# Patient Record
Sex: MALE | Race: WHITE | HISPANIC OR LATINO | Employment: FULL TIME | URBAN - METROPOLITAN AREA
[De-identification: names, ages, dates, MRNs, and addresses within clinical notes are randomized per-mention and may not be internally consistent; named-entity substitution may affect disease eponyms.]

---

## 2018-07-18 PROBLEM — E78.5 HYPERLIPIDEMIA: Status: ACTIVE | Noted: 2017-03-28

## 2018-07-18 RX ORDER — ASPIRIN 81 MG/1
TABLET ORAL EVERY 24 HOURS
COMMUNITY
Start: 2018-01-11

## 2018-07-18 RX ORDER — ATORVASTATIN CALCIUM 20 MG/1
TABLET, FILM COATED ORAL EVERY 24 HOURS
COMMUNITY
Start: 2018-01-11 | End: 2018-10-15 | Stop reason: SDUPTHER

## 2018-07-18 RX ORDER — AMLODIPINE BESYLATE 10 MG/1
TABLET ORAL EVERY 24 HOURS
COMMUNITY
Start: 2018-04-11 | End: 2018-07-19 | Stop reason: SDUPTHER

## 2018-07-19 ENCOUNTER — OFFICE VISIT (OUTPATIENT)
Dept: FAMILY MEDICINE CLINIC | Facility: CLINIC | Age: 53
End: 2018-07-19
Payer: COMMERCIAL

## 2018-07-19 VITALS
BODY MASS INDEX: 29.18 KG/M2 | RESPIRATION RATE: 16 BRPM | OXYGEN SATURATION: 99 % | DIASTOLIC BLOOD PRESSURE: 106 MMHG | WEIGHT: 197 LBS | SYSTOLIC BLOOD PRESSURE: 154 MMHG | HEART RATE: 59 BPM | HEIGHT: 69 IN | TEMPERATURE: 97.2 F

## 2018-07-19 DIAGNOSIS — I10 BENIGN ESSENTIAL HYPERTENSION: ICD-10-CM

## 2018-07-19 DIAGNOSIS — E78.2 MIXED HYPERLIPIDEMIA: ICD-10-CM

## 2018-07-19 DIAGNOSIS — Z11.4 ENCOUNTER FOR SCREENING FOR HIV: ICD-10-CM

## 2018-07-19 DIAGNOSIS — Z13.1 DIABETES MELLITUS SCREENING: ICD-10-CM

## 2018-07-19 DIAGNOSIS — I10 BENIGN ESSENTIAL HYPERTENSION: Primary | ICD-10-CM

## 2018-07-19 PROCEDURE — 99213 OFFICE O/P EST LOW 20 MIN: CPT | Performed by: FAMILY MEDICINE

## 2018-07-19 PROCEDURE — 1036F TOBACCO NON-USER: CPT | Performed by: FAMILY MEDICINE

## 2018-07-19 PROCEDURE — 3008F BODY MASS INDEX DOCD: CPT | Performed by: FAMILY MEDICINE

## 2018-07-19 RX ORDER — AMLODIPINE BESYLATE 10 MG/1
10 TABLET ORAL EVERY 24 HOURS
Qty: 30 TABLET | Refills: 3 | Status: SHIPPED | OUTPATIENT
Start: 2018-07-19 | End: 2018-07-19 | Stop reason: SDUPTHER

## 2018-07-19 RX ORDER — AMLODIPINE BESYLATE 10 MG/1
TABLET ORAL
Qty: 30 TABLET | Refills: 0 | Status: SHIPPED | OUTPATIENT
Start: 2018-07-19 | End: 2018-08-16 | Stop reason: SDUPTHER

## 2018-07-19 NOTE — ASSESSMENT & PLAN NOTE
Patient reports there has not been taking his blood pressure medication in the last 2 months as some refills have not been sent  Will send a refill as per patient advised to take medication tonight     Return to clinic in 1 week for blood pressure check

## 2018-07-26 ENCOUNTER — CLINICAL SUPPORT (OUTPATIENT)
Dept: FAMILY MEDICINE CLINIC | Facility: CLINIC | Age: 53
End: 2018-07-26

## 2018-07-26 VITALS — SYSTOLIC BLOOD PRESSURE: 146 MMHG | DIASTOLIC BLOOD PRESSURE: 90 MMHG

## 2018-07-26 DIAGNOSIS — I10 HYPERTENSION, UNSPECIFIED TYPE: Primary | ICD-10-CM

## 2018-07-26 PROCEDURE — RECHECK

## 2018-07-26 RX ORDER — ATORVASTATIN CALCIUM 20 MG/1
TABLET, FILM COATED ORAL EVERY 24 HOURS
COMMUNITY
Start: 2018-01-11 | End: 2018-10-15

## 2018-07-26 RX ORDER — HYDROCHLOROTHIAZIDE 12.5 MG/1
12.5 TABLET ORAL DAILY
Qty: 30 TABLET | Refills: 3 | Status: SHIPPED | OUTPATIENT
Start: 2018-07-26 | End: 2018-10-15

## 2018-07-26 NOTE — PROGRESS NOTES
Spoke with patient about blood pressure will add hydrochlorothiazide 12 5 to be taken in the morning  Advised patient to call the office if he notices any side effects  Patient to return to clinic in 1 week for blood pressure check

## 2018-07-26 NOTE — Clinical Note
Pt here for BP check; slight improvement from last reading but still elevated  Please contact pt with further instructions    Thanks Isabella Ashford

## 2018-08-16 DIAGNOSIS — I10 BENIGN ESSENTIAL HYPERTENSION: ICD-10-CM

## 2018-08-16 RX ORDER — AMLODIPINE BESYLATE 10 MG/1
TABLET ORAL
Qty: 30 TABLET | Refills: 0 | Status: SHIPPED | OUTPATIENT
Start: 2018-08-16 | End: 2018-09-15 | Stop reason: SDUPTHER

## 2018-09-15 DIAGNOSIS — I10 BENIGN ESSENTIAL HYPERTENSION: ICD-10-CM

## 2018-09-17 RX ORDER — AMLODIPINE BESYLATE 10 MG/1
TABLET ORAL
Qty: 30 TABLET | Refills: 0 | Status: SHIPPED | OUTPATIENT
Start: 2018-09-17 | End: 2018-10-15 | Stop reason: SDUPTHER

## 2018-10-06 ENCOUNTER — APPOINTMENT (OUTPATIENT)
Dept: LAB | Facility: HOSPITAL | Age: 53
End: 2018-10-06
Payer: COMMERCIAL

## 2018-10-06 DIAGNOSIS — I10 BENIGN ESSENTIAL HYPERTENSION: ICD-10-CM

## 2018-10-06 DIAGNOSIS — Z13.1 DIABETES MELLITUS SCREENING: ICD-10-CM

## 2018-10-06 DIAGNOSIS — E78.2 MIXED HYPERLIPIDEMIA: ICD-10-CM

## 2018-10-06 DIAGNOSIS — Z11.4 ENCOUNTER FOR SCREENING FOR HIV: ICD-10-CM

## 2018-10-06 LAB
ANION GAP SERPL CALCULATED.3IONS-SCNC: 10 MMOL/L (ref 5–14)
BUN SERPL-MCNC: 17 MG/DL (ref 5–25)
CALCIUM SERPL-MCNC: 10 MG/DL (ref 8.4–10.2)
CHLORIDE SERPL-SCNC: 100 MMOL/L (ref 97–108)
CHOLEST SERPL-MCNC: 242 MG/DL
CO2 SERPL-SCNC: 29 MMOL/L (ref 22–30)
CREAT SERPL-MCNC: 1.13 MG/DL (ref 0.7–1.5)
EST. AVERAGE GLUCOSE BLD GHB EST-MCNC: 97 MG/DL
GFR SERPL CREATININE-BSD FRML MDRD: 73 ML/MIN/1.73SQ M
GLUCOSE P FAST SERPL-MCNC: 102 MG/DL (ref 70–99)
HBA1C MFR BLD: 5 % (ref 4.2–6.3)
HDLC SERPL-MCNC: 42 MG/DL (ref 40–59)
LDLC SERPL CALC-MCNC: 183 MG/DL
NONHDLC SERPL-MCNC: 200 MG/DL
POTASSIUM SERPL-SCNC: 4.9 MMOL/L (ref 3.6–5)
SODIUM SERPL-SCNC: 139 MMOL/L (ref 137–147)
TRIGL SERPL-MCNC: 85 MG/DL

## 2018-10-06 PROCEDURE — 36415 COLL VENOUS BLD VENIPUNCTURE: CPT

## 2018-10-06 PROCEDURE — 83036 HEMOGLOBIN GLYCOSYLATED A1C: CPT

## 2018-10-06 PROCEDURE — 80048 BASIC METABOLIC PNL TOTAL CA: CPT

## 2018-10-06 PROCEDURE — 87389 HIV-1 AG W/HIV-1&-2 AB AG IA: CPT

## 2018-10-06 PROCEDURE — 80061 LIPID PANEL: CPT

## 2018-10-08 LAB — HIV 1+2 AB+HIV1 P24 AG SERPL QL IA: NORMAL

## 2018-10-14 PROBLEM — Z11.4 ENCOUNTER FOR SCREENING FOR HIV: Status: RESOLVED | Noted: 2018-07-19 | Resolved: 2018-10-14

## 2018-10-14 PROBLEM — Z13.1 DIABETES MELLITUS SCREENING: Status: RESOLVED | Noted: 2018-07-19 | Resolved: 2018-10-14

## 2018-10-15 ENCOUNTER — OFFICE VISIT (OUTPATIENT)
Dept: FAMILY MEDICINE CLINIC | Facility: CLINIC | Age: 53
End: 2018-10-15
Payer: COMMERCIAL

## 2018-10-15 VITALS
BODY MASS INDEX: 30.21 KG/M2 | OXYGEN SATURATION: 98 % | HEIGHT: 69 IN | SYSTOLIC BLOOD PRESSURE: 150 MMHG | WEIGHT: 204 LBS | RESPIRATION RATE: 18 BRPM | DIASTOLIC BLOOD PRESSURE: 84 MMHG | HEART RATE: 70 BPM | TEMPERATURE: 97.4 F

## 2018-10-15 DIAGNOSIS — I10 HYPERTENSION, UNSPECIFIED TYPE: ICD-10-CM

## 2018-10-15 DIAGNOSIS — I10 BENIGN ESSENTIAL HYPERTENSION: Primary | ICD-10-CM

## 2018-10-15 DIAGNOSIS — E78.2 MIXED HYPERLIPIDEMIA: ICD-10-CM

## 2018-10-15 DIAGNOSIS — Z28.21 VACCINATION NOT CARRIED OUT BECAUSE OF PATIENT REFUSAL: ICD-10-CM

## 2018-10-15 PROBLEM — Z23 ENCOUNTER FOR IMMUNIZATION: Status: ACTIVE | Noted: 2018-10-15

## 2018-10-15 PROCEDURE — 99213 OFFICE O/P EST LOW 20 MIN: CPT | Performed by: FAMILY MEDICINE

## 2018-10-15 PROCEDURE — 3008F BODY MASS INDEX DOCD: CPT | Performed by: FAMILY MEDICINE

## 2018-10-15 RX ORDER — ATORVASTATIN CALCIUM 20 MG/1
20 TABLET, FILM COATED ORAL DAILY
Qty: 30 TABLET | Refills: 3 | Status: SHIPPED | OUTPATIENT
Start: 2018-10-15 | End: 2021-11-03 | Stop reason: SDUPTHER

## 2018-10-15 RX ORDER — AMLODIPINE BESYLATE 10 MG/1
10 TABLET ORAL DAILY
Qty: 90 TABLET | Refills: 1 | Status: SHIPPED | OUTPATIENT
Start: 2018-10-15 | End: 2019-07-28 | Stop reason: SDDI

## 2018-10-15 NOTE — PROGRESS NOTES
Assessment/Plan:    Hyperlipidemia  Will repeat lipid profile if applicable  Patient is on appropriate anti lipid medication  Discussed and emphasized the importance of diet and exercise  Patient acknowledges that they understood what was discussed  Benign essential hypertension  Patient's blood pressure is currently not controlled  However, patient reports that he takes his blood pressure at home every day and his blood pressures under 120/80 every day at home  Therefore, will not make any changes to medication at the moment  Advised patient to bring in his blood pressure monitor on the next visit to go over his technique  Patient was given a sheet to log his blood pressures and the sheet on education of of things to do to improve his blood pressure  Patient denies any side effects with medications  Patient educated on the importance of weight loss, and appropriate dieting  Patient admits to be compliant with medications  Vaccination not carried out because of patient refusal  Patient denying flu vaccine  Diagnoses and all orders for this visit:    Benign essential hypertension  -     amLODIPine (NORVASC) 10 mg tablet; Take 1 tablet (10 mg total) by mouth daily    Mixed hyperlipidemia  -     atorvastatin (LIPITOR) 20 mg tablet; Take 1 tablet (20 mg total) by mouth daily    Hypertension, unspecified type    Vaccination not carried out because of patient refusal  -     influenza vaccine, [id number], quadrivalent, recombinant, PF, 0 5 mL, for patients 18 yr+ (FLUBLOK)          Subjective:      Patient ID: Sukumar Durbin is a 46 y o  male  This is a pleasant 26-year-old male with known past medical history of hyperlipidemia and hypertension who presents to the clinic for follow-up visit  Patient reports that his blood pressures have been well controlled at home with blood pressures under 120/80 and he is compliant with his medication    Patient states that he never received the hydrochlorothiazide but has been monitoring his blood pressures and is not concerned at the moment  Patient denies any chest pain or blood in the stool  The following portions of the patient's history were reviewed and updated as appropriate:   He  has a past medical history of HLD (hyperlipidemia) and Hypertension  He   Patient Active Problem List    Diagnosis Date Noted    Vaccination not carried out because of patient refusal 10/15/2018    Hyperlipidemia 03/28/2017    Benign essential hypertension 10/12/2012     He  has a past surgical history that includes No past surgeries  His family history includes Benign prostatic hyperplasia in his father; Diabetes in his father; No Known Problems in his mother; Prostate cancer in his father  He  reports that he has never smoked  He has never used smokeless tobacco  He reports that he drinks alcohol  He reports that he does not use drugs  Current Outpatient Prescriptions   Medication Sig Dispense Refill    amLODIPine (NORVASC) 10 mg tablet Take 1 tablet (10 mg total) by mouth daily 90 tablet 1    aspirin (ASPIR-81) 81 mg EC tablet every 24 hours      atorvastatin (LIPITOR) 20 mg tablet Take 1 tablet (20 mg total) by mouth daily 30 tablet 3    Calcium Carbonate-Vitamin D (CALCIUM-D) 600-400 MG-UNIT TABS Take 1 tablet by mouth 2 (two) times a day       No current facility-administered medications for this visit       Review of Systems   Constitutional: Negative for fatigue and fever  HENT: Negative for congestion and sore throat  Eyes: Negative for visual disturbance  Respiratory: Negative for cough, shortness of breath and wheezing  Cardiovascular: Negative for chest pain, palpitations and leg swelling  Gastrointestinal: Negative for abdominal pain, anal bleeding, constipation, diarrhea, nausea and vomiting  Endocrine: Negative for cold intolerance and heat intolerance  Musculoskeletal: Negative for arthralgias and joint swelling  Skin: Negative for color change  Neurological: Negative for dizziness, seizures, syncope, weakness and light-headedness  Psychiatric/Behavioral: Negative for agitation, confusion, sleep disturbance and suicidal ideas  Objective:      /84 (BP Location: Right arm, Patient Position: Sitting, Cuff Size: Standard)   Pulse 70   Temp (!) 97 4 °F (36 3 °C) (Temporal)   Resp 18   Ht 5' 8 5" (1 74 m)   Wt 92 5 kg (204 lb)   SpO2 98%   BMI 30 57 kg/m²          Physical Exam   Constitutional: He is oriented to person, place, and time  He appears well-developed and well-nourished  HENT:   Head: Normocephalic and atraumatic  Eyes: Pupils are equal, round, and reactive to light  Conjunctivae and EOM are normal    Neck: Normal range of motion  Neck supple  No JVD present  Cardiovascular: Normal rate, regular rhythm, normal heart sounds and intact distal pulses  Exam reveals no gallop and no friction rub  No murmur heard  Pulmonary/Chest: Effort normal and breath sounds normal  No respiratory distress  He has no wheezes  Abdominal: Soft  Bowel sounds are normal  He exhibits no distension  There is no tenderness  Musculoskeletal: Normal range of motion  Neurological: He is alert and oriented to person, place, and time  He has normal reflexes  Skin: Skin is warm and dry     Psychiatric: His behavior is normal  Judgment and thought content normal

## 2018-10-15 NOTE — ASSESSMENT & PLAN NOTE
Patient's blood pressure is currently not controlled  However, patient reports that he takes his blood pressure at home every day and his blood pressures under 120/80 every day at home  Therefore, will not make any changes to medication at the moment  Advised patient to bring in his blood pressure monitor on the next visit to go over his technique  Patient was given a sheet to log his blood pressures and the sheet on education of of things to do to improve his blood pressure  Patient denies any side effects with medications  Patient educated on the importance of weight loss, and appropriate dieting  Patient admits to be compliant with medications

## 2019-01-21 PROBLEM — Z11.59 SCREENING FOR VIRAL DISEASE: Status: ACTIVE | Noted: 2019-01-21

## 2019-07-26 DIAGNOSIS — I10 BENIGN ESSENTIAL HYPERTENSION: ICD-10-CM

## 2019-07-28 RX ORDER — AMLODIPINE BESYLATE 10 MG/1
10 TABLET ORAL EVERY 24 HOURS
Qty: 30 TABLET | Refills: 0 | Status: SHIPPED | OUTPATIENT
Start: 2019-07-28 | End: 2020-07-20

## 2019-07-29 NOTE — TELEPHONE ENCOUNTER
This patient has not been seen in 9 months in this office  Prescribed 30 days worth of medication  Will get no further medications until seen  Will contact clerical staff to schedule appointment

## 2020-07-20 DIAGNOSIS — I10 BENIGN ESSENTIAL HYPERTENSION: ICD-10-CM

## 2020-07-20 RX ORDER — AMLODIPINE BESYLATE 10 MG/1
TABLET ORAL
Qty: 90 TABLET | Refills: 1 | Status: SHIPPED | OUTPATIENT
Start: 2020-07-20 | End: 2021-01-14

## 2021-01-14 DIAGNOSIS — I10 BENIGN ESSENTIAL HYPERTENSION: ICD-10-CM

## 2021-01-14 RX ORDER — AMLODIPINE BESYLATE 10 MG/1
TABLET ORAL
Qty: 90 TABLET | Refills: 1 | Status: SHIPPED | OUTPATIENT
Start: 2021-01-14 | End: 2021-06-14

## 2021-06-12 DIAGNOSIS — I10 BENIGN ESSENTIAL HYPERTENSION: ICD-10-CM

## 2021-06-14 RX ORDER — AMLODIPINE BESYLATE 10 MG/1
TABLET ORAL
Qty: 90 TABLET | Refills: 1 | Status: SHIPPED | OUTPATIENT
Start: 2021-06-14 | End: 2021-11-03 | Stop reason: SDUPTHER

## 2021-11-02 PROBLEM — I25.10 CORONARY ARTERY DISEASE INVOLVING NATIVE CORONARY ARTERY OF NATIVE HEART: Status: ACTIVE | Noted: 2021-11-02

## 2021-11-02 PROBLEM — I21.4 NSTEMI (NON-ST ELEVATED MYOCARDIAL INFARCTION) (HCC): Status: ACTIVE | Noted: 2020-06-04

## 2021-11-02 PROBLEM — I21.4 NSTEMI (NON-ST ELEVATED MYOCARDIAL INFARCTION) (HCC): Status: RESOLVED | Noted: 2020-06-04 | Resolved: 2021-11-02

## 2021-11-02 RX ORDER — CARVEDILOL 3.12 MG/1
3.12 TABLET ORAL
COMMUNITY
Start: 2021-06-21 | End: 2021-11-03

## 2021-11-03 ENCOUNTER — OFFICE VISIT (OUTPATIENT)
Dept: FAMILY MEDICINE CLINIC | Facility: CLINIC | Age: 56
End: 2021-11-03

## 2021-11-03 ENCOUNTER — APPOINTMENT (OUTPATIENT)
Dept: LAB | Facility: CLINIC | Age: 56
End: 2021-11-03
Payer: COMMERCIAL

## 2021-11-03 VITALS
HEART RATE: 52 BPM | BODY MASS INDEX: 29.92 KG/M2 | SYSTOLIC BLOOD PRESSURE: 130 MMHG | WEIGHT: 202 LBS | TEMPERATURE: 96 F | DIASTOLIC BLOOD PRESSURE: 70 MMHG | RESPIRATION RATE: 16 BRPM | HEIGHT: 69 IN | OXYGEN SATURATION: 99 %

## 2021-11-03 DIAGNOSIS — R21 RASH AND NONSPECIFIC SKIN ERUPTION: ICD-10-CM

## 2021-11-03 DIAGNOSIS — I25.10 CORONARY ARTERY DISEASE INVOLVING NATIVE CORONARY ARTERY OF NATIVE HEART WITHOUT ANGINA PECTORIS: ICD-10-CM

## 2021-11-03 DIAGNOSIS — Z11.59 NEED FOR HEPATITIS C SCREENING TEST: ICD-10-CM

## 2021-11-03 DIAGNOSIS — I10 HTN, GOAL BELOW 130/80: ICD-10-CM

## 2021-11-03 DIAGNOSIS — E78.2 MIXED HYPERLIPIDEMIA: ICD-10-CM

## 2021-11-03 DIAGNOSIS — I10 HTN, GOAL BELOW 130/80: Primary | ICD-10-CM

## 2021-11-03 DIAGNOSIS — I10 BENIGN ESSENTIAL HYPERTENSION: ICD-10-CM

## 2021-11-03 LAB
ANION GAP SERPL CALCULATED.3IONS-SCNC: 4 MMOL/L (ref 4–13)
BUN SERPL-MCNC: 17 MG/DL (ref 5–25)
CALCIUM SERPL-MCNC: 9.4 MG/DL (ref 8.3–10.1)
CHLORIDE SERPL-SCNC: 101 MMOL/L (ref 100–108)
CHOLEST SERPL-MCNC: 227 MG/DL (ref 50–200)
CO2 SERPL-SCNC: 30 MMOL/L (ref 21–32)
CREAT SERPL-MCNC: 1.15 MG/DL (ref 0.6–1.3)
GFR SERPL CREATININE-BSD FRML MDRD: 70 ML/MIN/1.73SQ M
GLUCOSE P FAST SERPL-MCNC: 103 MG/DL (ref 65–99)
HCV AB SER QL: NORMAL
HDLC SERPL-MCNC: 51 MG/DL
LDLC SERPL CALC-MCNC: 162 MG/DL (ref 0–100)
NONHDLC SERPL-MCNC: 176 MG/DL
POTASSIUM SERPL-SCNC: 4.5 MMOL/L (ref 3.5–5.3)
SODIUM SERPL-SCNC: 135 MMOL/L (ref 136–145)
TRIGL SERPL-MCNC: 70 MG/DL

## 2021-11-03 PROCEDURE — 86803 HEPATITIS C AB TEST: CPT

## 2021-11-03 PROCEDURE — 1036F TOBACCO NON-USER: CPT | Performed by: FAMILY MEDICINE

## 2021-11-03 PROCEDURE — 80061 LIPID PANEL: CPT

## 2021-11-03 PROCEDURE — 36415 COLL VENOUS BLD VENIPUNCTURE: CPT

## 2021-11-03 PROCEDURE — 80048 BASIC METABOLIC PNL TOTAL CA: CPT

## 2021-11-03 PROCEDURE — 3008F BODY MASS INDEX DOCD: CPT | Performed by: FAMILY MEDICINE

## 2021-11-03 PROCEDURE — 99214 OFFICE O/P EST MOD 30 MIN: CPT | Performed by: FAMILY MEDICINE

## 2021-11-03 PROCEDURE — 3725F SCREEN DEPRESSION PERFORMED: CPT | Performed by: FAMILY MEDICINE

## 2021-11-03 RX ORDER — ATORVASTATIN CALCIUM 40 MG/1
40 TABLET, FILM COATED ORAL DAILY
Qty: 90 TABLET | Refills: 2 | Status: SHIPPED | OUTPATIENT
Start: 2021-11-03

## 2021-11-03 RX ORDER — AMLODIPINE BESYLATE 5 MG/1
10 TABLET ORAL DAILY
Qty: 90 TABLET | Refills: 2 | Status: SHIPPED | OUTPATIENT
Start: 2021-11-03 | End: 2021-11-03 | Stop reason: SDUPTHER

## 2021-11-03 RX ORDER — AMLODIPINE BESYLATE 5 MG/1
5 TABLET ORAL DAILY
Qty: 90 TABLET | Refills: 2 | Status: SHIPPED | OUTPATIENT
Start: 2021-11-03

## 2021-11-03 RX ORDER — FEXOFENADINE HCL 180 MG/1
180 TABLET ORAL DAILY
Qty: 30 TABLET | Refills: 2 | Status: SHIPPED | OUTPATIENT
Start: 2021-11-03

## 2021-11-04 ENCOUNTER — TELEPHONE (OUTPATIENT)
Dept: FAMILY MEDICINE CLINIC | Facility: CLINIC | Age: 56
End: 2021-11-04

## 2021-11-04 DIAGNOSIS — Z12.5 PROSTATE CANCER SCREENING: Primary | ICD-10-CM

## 2021-11-05 ENCOUNTER — APPOINTMENT (OUTPATIENT)
Dept: LAB | Facility: CLINIC | Age: 56
End: 2021-11-05
Payer: COMMERCIAL

## 2021-11-05 DIAGNOSIS — Z12.5 PROSTATE CANCER SCREENING: ICD-10-CM

## 2021-11-05 LAB — PSA SERPL-MCNC: 0.3 NG/ML (ref 0–4)

## 2021-11-05 PROCEDURE — G0103 PSA SCREENING: HCPCS

## 2021-11-05 PROCEDURE — 36415 COLL VENOUS BLD VENIPUNCTURE: CPT

## 2022-10-11 ENCOUNTER — TELEPHONE (OUTPATIENT)
Dept: FAMILY MEDICINE CLINIC | Facility: CLINIC | Age: 57
End: 2022-10-11

## 2022-10-11 DIAGNOSIS — I10 BENIGN ESSENTIAL HYPERTENSION: ICD-10-CM

## 2022-10-11 NOTE — TELEPHONE ENCOUNTER
Pt came into office requesting medication refill for     amLODIPine (NORVASC) 5 mg tablet     Please send to pharmacy on file

## 2022-10-11 NOTE — TELEPHONE ENCOUNTER
Pt came into office requesting to change PCP and upcoming appt due to needing afternoon appointments  I have rescheduled his upcoming appt with Dr Vahid Heard for 11/9/22

## 2022-10-15 RX ORDER — AMLODIPINE BESYLATE 5 MG/1
5 TABLET ORAL DAILY
Qty: 90 TABLET | Refills: 2 | Status: SHIPPED | OUTPATIENT
Start: 2022-10-15

## 2022-11-09 ENCOUNTER — OFFICE VISIT (OUTPATIENT)
Dept: FAMILY MEDICINE CLINIC | Facility: CLINIC | Age: 57
End: 2022-11-09

## 2022-11-09 VITALS
WEIGHT: 213 LBS | DIASTOLIC BLOOD PRESSURE: 82 MMHG | HEIGHT: 69 IN | TEMPERATURE: 97.6 F | SYSTOLIC BLOOD PRESSURE: 132 MMHG | OXYGEN SATURATION: 99 % | RESPIRATION RATE: 16 BRPM | BODY MASS INDEX: 31.55 KG/M2 | HEART RATE: 86 BPM

## 2022-11-09 DIAGNOSIS — Z12.11 ENCOUNTER FOR SCREENING COLONOSCOPY: ICD-10-CM

## 2022-11-09 DIAGNOSIS — M72.2 PLANTAR FASCIITIS: ICD-10-CM

## 2022-11-09 DIAGNOSIS — Z00.00 ROUTINE CHECK-UP: Primary | ICD-10-CM

## 2022-11-09 RX ORDER — IBUPROFEN 600 MG/1
600 TABLET ORAL EVERY 6 HOURS PRN
Qty: 30 TABLET | Refills: 0 | Status: SHIPPED | OUTPATIENT
Start: 2022-11-09 | End: 2022-11-23

## 2022-11-09 NOTE — PATIENT INSTRUCTIONS
Medición del antígeno prostático específico   INFORMACIÓN GENERAL:  ¿Qué es apolinar examen? Apolinar examen mide la cantidad de antígeno prostático específico (PSA) en la kavon  Apolinar examen puede ser usado cuando hiperplasia prostática benigna es sospechada  [place] también puede ser usado para analizar por cáncer de próstata, para ayudar diagnosticar cáncer de próstata cuando es sospechado y para supervisar cáncer de próstata después de tratamiento  ¿Cuáles son otros nombres para apolinar examen? Prostate specific antigen measurement  PSA measurement  PSA - Prostate-specific antigen level  PSA - Serum prostate specific antigen level  tPSA measurement - Total prostate specific antigen measurement  ¿Cuáles son otros exámenes similares? Biopsia transrectal de la próstata  Examen rectal  ¿Por qué puedo necesitar apolinar examen? Los exámenes de laboratorio pueden realizarse por muchas razones  Los exámenes se realizan [address] [facility] rutinaria de giacomo o sospecha de enfermedad o de toxicidad  También pueden ser [employer] para determinar si [place]  Los exámenes también podrían usarse para medir la efectividad o fracaso de un medicamento o plan de tratamiento  Pueden ser solicitados por razones médicas o legales  Usted podría necesitar apolinar examen si tiene:   Cáncer de próstata  Hipertrofia prostática benigna (HPB)  ¿Cuándo y con qué frecuencia puedo tener apolinar examen? Existen varios factores para determinar cuándo y con qué frecuencia se pueden realizar los exámenes  Collins duración puede depender de [place] o terminación de otros exámenes, procedimientos o tratamientos  Los exámenes pueden ser realizados inmediatamente en gloria emergencia o pueden ser demorados conforme gloria condición es tratada o monitoreada  Se puede sugerir un examen o llegar a ser necesario cuando aparecen ciertos signos o síntomas    Debido a cambios de las funciones naturales del organismo tessa [address], [place] exámenes pueden ser realizados en gloria determinada hora  Si usted se ha preparado para apolinar examen con cambios en la ingesta de comida o líquidos, los exámenes pueden ser realizados de acuerdo con [employer]  Los intervalos para la realización de los exámenes pueden basarse en el aumento o disminución de los niveles de [place], drogas u otras sustancias en el organismo  La edad o el género de las personas puede influir en la fecha y la frecuencia con que se requiere un examen  Las condiciones crónicas o progresivas pueden necesitar un monitoreo continuo mediante exámenes  Ciertos exámenes pueden ser repetidos para obtener gloria serie de [place] o para confirmar o [employer]  Las veces que deban [employer] exámenes y robertson frecuencia varían dependiendo si se llevan a cabo por razones profesionales o legales  ¿Cómo marisa prepararme para el examen? Antes de que le tomen la [facility], informe al técnico laboratorista si es usted alérgico al látex y si está ingiriendo medicamentos que puedan causar sangrado excesivo  También informe si en alguna ocasión presentó náuseas, ligero dolor de [place] o debilidad cuando le [facility] [place]  ¿Cómo se hace el examen? Cuando se necesita obtener gloria muestra de [place], generalmente se selecciona gloria vena del brazo  Se puede poner un torniquete (canas larga de caucho) para katy la vena  Se limpia la piel sobre la vena y se introduce la aguja  Le pedirán que permanezca muy quieto mientras dominik [facility]  La kavon se puede recolectar en néstor o más tubos y el torniquete puede ser retirado  Cuando se ha obtenido suficiente kavon para la [place] técnico laboratorista saca la aguja  ¿Qué me sentiré tessa el examen? El [place] que usted puede sentir dependerá de muchos factores, incluyendo robertson sensibilidad para el dolor  Comuníquele a la persona que realiza la prueba lo que esté sintiendo   Informe a dicha persona si usted no puede continuar con el procedimiento  Taiwo la felipe de la [place] puede sentir gloria ligera molestia en el sitio donde ésta se obtiene  ¿Qué marisa hacer después del examen? Después de obtener la [place] sanguínea de gloria vena, se coloca un algodón, ray adhesiva o gasa en el área donde fue insertada la aguja  Le pueden solicitar que aplique presión en el área  Evite practicar ejercicio intenso inmediatamente después de la felipe sanguínea  Avise al técnico laboratorista si usted presenta dolor, enrojecimiento, hinchazón o descarga en el sitio de la punción  ¿Cuáles son los riesgos? Kavon: En el sitio donde se felipe la [place] puede ocurrir la extravasación de Vishnu, un hematoma (kavon extravasada debajo de la piel), así alf gloria infección  Las personas que realizan estos exámenes pueden necesitar hacerlos más de gloria vez  Consulte con robertson médico en bria de mattie acerca de los riesgos de [facility]  ¿Cuáles son los resultados normales para éste examen? Los [employer] de las pruebas de laboratorio pueden variar dependiendo de la edad, género, historia clínica, el método usado para esta prueba y Jb Diadema 1904  Si monserrat resultados son diferentes de los [employer] sugeridos a continuación, esto no significa que usted tenga gloria enfermedad  Contacte a robertson médico para cualquier pregunta que tenga  Los C H  [employer] son considerados normales para estas pruebas:  Varones, ?40 años de edad: 0-2 ng/mL (0-2 mcg/L) :  Varones, >40 años de edad: 0-4 ng/mL (0-4 mcg/L)  Hembras: <0 5 ng/mL (<0 5 mcg/L) :  ¿Qué puede afectar los resultados de mi examen? Finasteride  [place] [address] Capromab Pendetide  La eyaculación y un examen rectal digital pueden causar un subida insignificante en los niveles de PSA, mientras la biopsia de próstata y citoscopia pueden causar aumentos sustanciales; los examenes de PSA deberían ser retrasados hasta 3 a 4 semanas después de estos procedimientos   El ejercicio, infección y algunos medicamentos puede también causar gloria subida en los niveles de PSA  Finasteride bajará PSA por el aproximadamente 50%  ¿Qué son los próximos pasos después del examen? Pide a tu médico que te informe [employer] de tus exámenes  Puede ser que te pidan que llames para pedir [place], que hagas gloria silas para discutirlos con tu médico, o puede ser que [employer] por correo  El seguimiento depende de muchos factores relacionados con los resultados de [place]  Incluso, puede ser que no tengas que hacer ningún seguimiento después de [facility]  [employer]  En algunos casos, pueden sugerirte o ser [employer]  Algunos ejemplos de seguimiento pueden ser cambios en tus medicamentos, o planes de tratamiento, referirte a un especialista, gloria vigilancia con mayor o narendra frecuencia, o pruebas o procedimientos adicionales  Platica con tu médico sobre cualquier cosa que te preocupe o cualquier mattie que tengas acerca de tu tratamiento o las instrucciones que te ha dado  ¿Dónde puedo conseguir más información? Related Companies   American Urological Association - https://www morales org/  org  National Kidney and Urologic Diseases Information Clearinghouse - http://kidney  niddk nih gov/    ACUERDOS SOBRE ROBERTSON CUIDADO:   Usted tiene el derecho de participar en la planificación de monserrat cuidados  Para ayudar en esta planificación; usted debe informarse acerca de robertson estado de giacomo y [place] la forma alf puede tratarse  Doroteo Hank, ted y monserrat médicos pueden hablar acerca de monserrat opciones y decidir el cuidado que se usará tessa robertson tratamiento  Usted siempre tiene el derecho a rechazar robertson tratamiento  © Copyright [employer] 2021  Information is for End User's use only and may not be sold, redistributed or otherwise used for commercial purposes  The above information is an  only   It is not intended as a substitute for medical advice for your individual conditions or treatments  Talk to your doctor, nurse or pharmacist before following any medical regimen to see if it is safe and effective for you  Ejercicios para la fascitis plantar   LO QUE NECESITA SABER:   Los ejercicios para la fascitis plantar ayudan a estirar la fascia plantar, los músculos de la pantorrilla y el tendón de [place]  También ayudan a fortalecer los músculos que dan soporte a los talones y los pies  Debbie Fenalanis y el estiramiento pueden ayudar a evitar que la fascitis plantar empeore o regrese  INSTRUCCIONES SOBRE EL TIARA HOSPITALARIA:   Comuníquese con robertson médico si:  Robertson dolor e inflamación aumentan    Usted desarrolla un nuevo dolor en la rodilla, cadera o espalda  Usted tiene preguntas o inquietudes acerca de robertson condición o cuidado  Cómo hacer ejercicios para la fascitis plantar: Pregunte a robertson médico cuándo debe comenzar a hacer estos ejercicios y con qué frecuencia debe realizarlos  Estiramiento en tabla inclinada: Párese sobre gloria tabla inclinada con los dedos de los pies más elevados que robertson talón  Presione el talón contra la tabla  Mantenga robertson rodilla ligeramente flexionada  Mantenga esta posición tessa 1 minuto  Repita 5 veces  Estiramiento del talón: Párese derecho con las [employer] pared  Coloque la pierna lesionada ligeramente detrás de la otra pierna  Mantenga los [employer], inclínese hacia adelante y doble ambas rodillas  Sostenga está posición por 30 segundos  Estiramiento de la pantorrilla: Párese derecho con las [employer] pared  [place] un paso hacia adelante de forma que robertson pie no lastimado se encuentre en frente de robertson pie lastimado  Mantenga la pierna de adelante doblada y la pierna de atrás derecha  Cuidadosamente inclínese hacia adelante hasta que sienta robertson pantorrilla estirarse  Sostenga la tensión por 27 segundos y [employer]  Estiramiento de la fascia plantar sentado: Siéntese en gloria superficie firme, alf el piso o gloria alfombra  Extienda las piernas [employer]  Eleve el pie lesionado unas pocas pulgadas por encima del suelo  Mantenga robertson pierna derecha  Agárrese los dedos del pie lesionado y llévelos hacia usted  Con la otra mano, tóquese la fascia plantar  Debe sentir que hace presión hacia afuera  Sostenga está posición por 30 segundos  Si no puede llegar a los dedos, coloque gloria toalla o gloria corbata alrededor de robertson pie  Tire suavemente de la toalla o la corbata y flexione los dedos [place] usted  Levantamiento de talones: Párese sobre la pierna lesionada  Levante la otra pierna del suelo  Agárrese de gloria baranda o gloria pared para mantener el equilibrio  Levántese lentamente [employer] dedos de la pierna lesionada  Sostenga está posición por 5 segundos  Baje el talón despacio [employer]  Enrollar los dedos del pie: Coloque gloria toalla sobre el piso  Ponga robertson pie plano sobre la toalla  Agarre la toalla con los dedos del pie  Levante la toalla con los dedos del pie  Golpecitos con el dedo jamaal del pie: Siéntese y coloque monserrat pies en posición plana sobre el piso  Mantenga robertson talón en el suelo  Apunte con los dedos de robertson pie hacia el techo  Con monserrat 4 dedos más pequeños apuntando [place], doble robertson dedo jamaal [place] y dé golpecitos suaves contra el piso  Dé entre 10 y 48 golpecitos  Apunte todos los 5 dedos del pie hacia el techo nuevamente  Esta vez, deje el dedo jamaal apuntando [place] y dé golpecitos en el piso con los 4 dedos más pequeños  Dé Pam Beam y cincuenta golpecitos cada vez  Acuda a la consulta de control con robertson médico según las indicaciones: Anote monserrat preguntas para que se acuerde de hacerlas tessa monserrat visitas  © Copyright [address] 2022 Information is for End User's use only and may not be sold, redistributed or otherwise used for commercial purposes   All illustrations and images included in CareNotes® are the copyrighted property of General Specific D A [employer]  or [facility]  Esta información es sólo para uso en educación  Robertson intención no es darle un consejo médico sobre enfermedades o tratamientos  Colsulte con robertson Clarine Sours farmacéutico antes de seguir cualquier régimen médico para saber si es seguro y efectivo para usted

## 2022-11-09 NOTE — PROGRESS NOTES
Name: Arnol Mathew      : 1965      MRN: [id number]  Encounter Provider: Marcos Faustin MD  Encounter Date: 2022   Encounter department: [address] [place] [address]     1  Routine check-up  Assessment & Plan:  IPSS score of 9  Will send for PSA though previous value normal   Extensive counseling performed regarding PSA, appropriate repeat testing, and false-positive results  Will also send for routine lab work  -BMP, lipid panel, PSA, H A1c  -Screening colonoscopy ordered  -to return for full annual physical at a later date  To review vaccinations, depression screening, and BMI    Orders:  -     PSA, Total Screen; Future  -     Basic metabolic panel; Future  -     Lipid Panel with Direct LDL reflex; Future  -     HEMOGLOBIN A1C W/ EAG ESTIMATION; Future    2  Plantar fasciitis  Assessment & Plan:  Likely diagnosis given patient's level of activity and physical examination  If symptoms worsen or poor response to conservative management may require imaging  Offered physical therapy the patient refused at this time     -begin ibuprofen 600 mg q 6 p r n  for 14 days  -advised to return if symptoms worsen or pain remains unchanged  -given home stretching exercises for plantar fasciitis  -Pt deferred per patient's request  -We will reassess during patient's annual physical examination    Orders:  -     ibuprofen (MOTRIN) 600 mg tablet; Take 1 tablet (600 mg total) by mouth every 6 (six) hours as needed for mild pain for up to 14 days    3  Encounter for screening colonoscopy  -     Ambulatory referral for colonoscopy; Future         Subjective      Pleasant 58-year-old male with past medical history of hypertension, hyperlipidemia, coronary artery disease presenting to clinic requesting annual laboratory workup and evaluation of left lower ankle pain      Annual workup-states he is not undergone annual physical interim time and would like blood work to evaluate his health  Specifically requesting prostate examination, both digital rectal exam and PSA blood work  Would also like referral for colonoscopy  No family history of colon cancer and denying personal weight loss, constipation, hematochezia, and melena  Left lower ankle pain-present for the past 2-3 months  Is usually very active walking for 45 minutes regularly for exercise but had to decrease now walking 30 minutes regularly  States worst pain is felt with 1st step of the morning or when taking 1st step after inactivity such as when getting up from the commode  Pain located near medial malleolus  Denying extremity weakness or change in sensation  Has taken Tylenol 250 mg once on random occasions but he states this does not help  Review of Systems   Constitutional: Negative for chills and fever  HENT: Negative for congestion, rhinorrhea, sore throat and trouble swallowing  Respiratory: Negative for cough, chest tightness and shortness of breath  Cardiovascular: Negative for chest pain, palpitations and leg swelling  Gastrointestinal: Negative for abdominal pain, blood in stool, constipation, diarrhea, nausea and vomiting  Genitourinary: Positive for difficulty urinating (On occasion)  Negative for dysuria, frequency, hematuria and urgency  Musculoskeletal: Negative for back pain  Skin: Negative for color change and rash  Neurological: Negative for dizziness, seizures, syncope, weakness, numbness and headaches  Psychiatric/Behavioral: Negative for suicidal ideas         Current Outpatient Medications on File Prior to Visit   Medication Sig   • amLODIPine (NORVASC) 5 mg tablet Take 1 tablet (5 mg total) by mouth daily   • aspirin (ASPIR-81) 81 mg EC tablet every 24 hours   • atorvastatin (LIPITOR) 40 mg tablet Take 1 tablet (40 mg total) by mouth daily   • fexofenadine (ALLEGRA) 180 MG tablet Take 1 tablet (180 mg total) by mouth daily   • hydrocortisone 2 5 % cream Apply topically 2 (two) times a day       Objective     /82 (BP Location: Left arm, Patient Position: Sitting, Cuff Size: Standard)   Pulse 86   Temp 97 6 °F (36 4 °C) (Temporal)   Resp 16   Ht 5' 8 5" (1 74 m)   Wt 96 6 kg (213 lb)   SpO2 99%   BMI 31 92 kg/m²     Physical Exam  Vitals and nursing note reviewed  Constitutional:       General: He is not in acute distress  Appearance: Normal appearance  He is normal weight  He is not ill-appearing, toxic-appearing or diaphoretic  HENT:      Head: Normocephalic and atraumatic  Right Ear: External ear normal       Left Ear: External ear normal       Nose: Nose normal    Eyes:      General: No scleral icterus  Right eye: No discharge  Left eye: No discharge  Conjunctiva/sclera: Conjunctivae normal    Cardiovascular:      Rate and Rhythm: Normal rate and regular rhythm  Pulses: Normal pulses  Dorsalis pedis pulses are 2+ on the left side  Posterior tibial pulses are 2+ on the left side  Heart sounds: Normal heart sounds  No murmur heard  No friction rub  No gallop  Pulmonary:      Effort: Pulmonary effort is normal  No respiratory distress  Breath sounds: Normal breath sounds  No stridor  No wheezing, rhonchi or rales  Chest:      Chest wall: No tenderness  Musculoskeletal:         General: Normal range of motion  Cervical back: Normal range of motion  Right lower leg: No edema  Left lower leg: No edema  Left ankle: Normal  No swelling or deformity  No tenderness  Normal range of motion  Left Achilles Tendon: Normal  No tenderness  Left foot: Normal range of motion  Tenderness present  No swelling, deformity, bunion, foot drop, prominent metatarsal heads, bony tenderness or crepitus  Normal pulse          Feet:    Feet:      Left foot:      Skin integrity: Skin integrity normal       Toenail Condition: Left toenails are normal    Skin:     General: Skin is warm and dry  Coloration: Skin is not jaundiced or pale  Findings: No bruising, erythema, lesion or rash  Neurological:      General: No focal deficit present  Mental Status: He is alert     Psychiatric:         Mood and Affect: Mood normal          Behavior: Behavior normal        Francisco James MD

## 2022-11-10 NOTE — ASSESSMENT & PLAN NOTE
Likely diagnosis given patient's level of activity and physical examination  If symptoms worsen or poor response to conservative management may require imaging    Offered physical therapy the patient refused at this time     -begin ibuprofen 600 mg q 6 p r n  for 14 days  -advised to return if symptoms worsen or pain remains unchanged  -given home stretching exercises for plantar fasciitis  -Pt deferred per patient's request  -We will reassess during patient's annual physical examination

## 2022-11-10 NOTE — ASSESSMENT & PLAN NOTE
IPSS score of 9  Will send for PSA though previous value normal   Extensive counseling performed regarding PSA, appropriate repeat testing, and false-positive results  Will also send for routine lab work  -BMP, lipid panel, PSA, H A1c  -Screening colonoscopy ordered  -to return for full annual physical at a later date    To review vaccinations, depression screening, and BMI

## 2022-11-25 ENCOUNTER — APPOINTMENT (OUTPATIENT)
Dept: LAB | Facility: CLINIC | Age: 57
End: 2022-11-25

## 2022-11-25 DIAGNOSIS — Z00.00 ROUTINE CHECK-UP: ICD-10-CM

## 2022-11-25 LAB
ANION GAP SERPL CALCULATED.3IONS-SCNC: 6 MMOL/L (ref 4–13)
BUN SERPL-MCNC: 21 MG/DL (ref 5–25)
CALCIUM SERPL-MCNC: 10.3 MG/DL (ref 8.3–10.1)
CHLORIDE SERPL-SCNC: 105 MMOL/L (ref 96–108)
CHOLEST SERPL-MCNC: 176 MG/DL
CO2 SERPL-SCNC: 28 MMOL/L (ref 21–32)
CREAT SERPL-MCNC: 1.4 MG/DL (ref 0.6–1.3)
EST. AVERAGE GLUCOSE BLD GHB EST-MCNC: 88 MG/DL
GFR SERPL CREATININE-BSD FRML MDRD: 54 ML/MIN/1.73SQ M
GLUCOSE P FAST SERPL-MCNC: 104 MG/DL (ref 65–99)
HBA1C MFR BLD: 4.7 %
HDLC SERPL-MCNC: 60 MG/DL
LDLC SERPL CALC-MCNC: 105 MG/DL (ref 0–100)
POTASSIUM SERPL-SCNC: 4.4 MMOL/L (ref 3.5–5.3)
PSA SERPL-MCNC: 0.3 NG/ML (ref 0–4)
SODIUM SERPL-SCNC: 139 MMOL/L (ref 135–147)
TRIGL SERPL-MCNC: 57 MG/DL

## 2022-12-07 ENCOUNTER — OFFICE VISIT (OUTPATIENT)
Dept: FAMILY MEDICINE CLINIC | Facility: CLINIC | Age: 57
End: 2022-12-07

## 2022-12-07 VITALS
HEART RATE: 61 BPM | RESPIRATION RATE: 16 BRPM | TEMPERATURE: 98.7 F | HEIGHT: 69 IN | WEIGHT: 207 LBS | BODY MASS INDEX: 30.66 KG/M2 | DIASTOLIC BLOOD PRESSURE: 80 MMHG | OXYGEN SATURATION: 97 % | SYSTOLIC BLOOD PRESSURE: 120 MMHG

## 2022-12-07 DIAGNOSIS — M72.2 PLANTAR FASCIITIS: Primary | ICD-10-CM

## 2022-12-07 DIAGNOSIS — N18.31 STAGE 3A CHRONIC KIDNEY DISEASE (HCC): ICD-10-CM

## 2022-12-07 RX ORDER — SENNOSIDES 8.6 MG
650 CAPSULE ORAL EVERY 6 HOURS SCHEDULED
Qty: 56 TABLET | Refills: 0 | Status: SHIPPED | OUTPATIENT
Start: 2022-12-07 | End: 2022-12-21

## 2022-12-07 NOTE — PROGRESS NOTES
Name: Shama Pearson      : 1965      MRN: [id number]  Encounter Provider: Alexi Quiroz MD  Encounter Date: 2022   Encounter department: [facility]     1  Plantar fasciitis  Assessment & Plan:  Likely dx still plantar fasciitis given hx and initial improvement with conservative management however will order imaging to r/o fracture  Pt now agreeable to physical therapy  -will discontinue ibuprofen given CKD and begin acetaminophen 650mg q6 scheduled for pain control for 2 weeks  -XR of left foot ordered  -physical therapy referral placed  -counseled on continued conservative management such as stretching exercises and massaging of area    Orders:  -     XR foot 3+ vw left; Future; Expected date: 2022  -     Ambulatory Referral to Physical Therapy; Future  -     acetaminophen (TYLENOL) 650 mg CR tablet; Take 1 tablet (650 mg total) by mouth every 6 (six) hours for 14 days    2  Stage 3a chronic kidney disease [facility])  Assessment & Plan:  Lab Results   Component Value Date    EGFR 54 2022    EGFR 70 2021    EGFR 73 10/06/2018    CREATININE 1 40 (H) 2022    CREATININE 1 15 2021    CREATININE 1 13 10/06/2018       Counseled on dx and management  Advised to maintain appropriate hydration  Will avoid nephrotoxic medications and contrast imaging whenever possible  Subjective      Pleasant 60yo male presenting to clinic for follow up of plantar fasciitis  Pain is still present despite conservative management  Pain is 10/10 with first step but improves to 5/10 with continued movement and becomes 2-3/10 with medication  Pt does believe that poor response to management is due to his increase of activity after feeling substantial initial improvement  No notable trauma to area as well as no changes in strength or sensation of extremity   Does also not some discomfort in right foot as he has been putting weight on foot to offset pain on left  Review of Systems   Constitutional: Negative for chills and fever  HENT: Negative for congestion, rhinorrhea, sore throat and trouble swallowing  Eyes: Negative for visual disturbance  Respiratory: Negative for cough and shortness of breath  Cardiovascular: Negative for chest pain  Gastrointestinal: Negative for abdominal pain, blood in stool, constipation, diarrhea, nausea and vomiting  Genitourinary: Negative for dysuria and hematuria  Musculoskeletal:        Foot pain   Skin: Negative for color change and rash  Neurological: Negative for dizziness, seizures, syncope, weakness, numbness and headaches  Current Outpatient Medications on File Prior to Visit   Medication Sig   • amLODIPine (NORVASC) 5 mg tablet Take 1 tablet (5 mg total) by mouth daily   • aspirin (ASPIR-81) 81 mg EC tablet every 24 hours   • atorvastatin (LIPITOR) 40 mg tablet Take 1 tablet (40 mg total) by mouth daily   • fexofenadine (ALLEGRA) 180 MG tablet Take 1 tablet (180 mg total) by mouth daily   • hydrocortisone 2 5 % cream Apply topically 2 (two) times a day   • ibuprofen (MOTRIN) 600 mg tablet Take 1 tablet (600 mg total) by mouth every 6 (six) hours as needed for mild pain for up to 14 days       Objective     /80 (BP Location: Right arm, Patient Position: Sitting, Cuff Size: Standard)   Pulse 61   Temp 98 7 °F (37 1 °C) (Temporal)   Resp 16   Ht 5' 8 5" (1 74 m)   Wt 93 9 kg (207 lb)   SpO2 97%   BMI 31 02 kg/m²     Physical Exam  Vitals and nursing note reviewed  Constitutional:       General: He is not in acute distress  Appearance: Normal appearance  He is normal weight  He is not ill-appearing, toxic-appearing or diaphoretic  HENT:      Head: Normocephalic and atraumatic  Right Ear: External ear normal       Left Ear: External ear normal       Nose: Nose normal    Eyes:      General: No scleral icterus  Right eye: No discharge           Left eye: No discharge  Conjunctiva/sclera: Conjunctivae normal    Cardiovascular:      Rate and Rhythm: Normal rate and regular rhythm  Pulses: Normal pulses  Dorsalis pedis pulses are 2+ on the left side  Posterior tibial pulses are 2+ on the left side  Heart sounds: Normal heart sounds  No murmur heard  No friction rub  No gallop  Pulmonary:      Effort: Pulmonary effort is normal  No respiratory distress  Breath sounds: Normal breath sounds  No stridor  No wheezing, rhonchi or rales  Chest:      Chest wall: No tenderness  Musculoskeletal:         General: Normal range of motion  Cervical back: Normal range of motion  Right lower leg: No edema  Left lower leg: No edema  Left ankle: Normal  No swelling or deformity  No tenderness  Normal range of motion  Left Achilles Tendon: Normal  No tenderness  Right foot: Normal  Normal range of motion  No swelling, deformity, bunion, foot drop, prominent metatarsal heads, tenderness, bony tenderness or crepitus  Normal pulse  Left foot: Normal range of motion  Tenderness present  No swelling, deformity, bunion, foot drop, prominent metatarsal heads, bony tenderness or crepitus  Normal pulse  Feet:    Feet:      Right foot:      Skin integrity: Skin integrity normal       Toenail Condition: Right toenails are normal       Left foot:      Skin integrity: Skin integrity normal       Toenail Condition: Left toenails are normal    Skin:     General: Skin is warm and dry  Coloration: Skin is not jaundiced or pale  Findings: No bruising, erythema, lesion or rash  Neurological:      General: No focal deficit present  Mental Status: He is alert     Psychiatric:         Mood and Affect: Mood normal          Behavior: Behavior normal        Gardenia Jara MD

## 2022-12-08 NOTE — ASSESSMENT & PLAN NOTE
Likely dx still plantar fasciitis given hx and initial improvement with conservative management however will order imaging to r/o fracture  Pt now agreeable to physical therapy      -will discontinue ibuprofen given CKD and begin acetaminophen 650mg q6 scheduled for pain control for 2 weeks  -XR of left foot ordered  -physical therapy referral placed  -counseled on continued conservative management such as stretching exercises and massaging of area

## 2022-12-08 NOTE — ASSESSMENT & PLAN NOTE
Lab Results   Component Value Date    EGFR 54 11/25/2022    EGFR 70 11/03/2021    EGFR 73 10/06/2018    CREATININE 1 40 (H) 11/25/2022    CREATININE 1 15 11/03/2021    CREATININE 1 13 10/06/2018       Counseled on dx and management  Advised to maintain appropriate hydration  Will avoid nephrotoxic medications and contrast imaging whenever possible

## 2022-12-13 ENCOUNTER — HOSPITAL ENCOUNTER (OUTPATIENT)
Dept: RADIOLOGY | Facility: HOSPITAL | Age: 57
Discharge: HOME | End: 2022-12-13

## 2022-12-13 DIAGNOSIS — M72.2 PLANTAR FASCIITIS: ICD-10-CM

## 2022-12-22 ENCOUNTER — OFFICE VISIT (OUTPATIENT)
Dept: FAMILY MEDICINE CLINIC | Facility: CLINIC | Age: 57
End: 2022-12-22

## 2022-12-22 VITALS
HEART RATE: 70 BPM | BODY MASS INDEX: 31.47 KG/M2 | SYSTOLIC BLOOD PRESSURE: 136 MMHG | WEIGHT: 212.5 LBS | HEIGHT: 69 IN | TEMPERATURE: 97.8 F | RESPIRATION RATE: 18 BRPM | DIASTOLIC BLOOD PRESSURE: 82 MMHG

## 2022-12-22 DIAGNOSIS — M72.2 PLANTAR FASCIITIS: Primary | ICD-10-CM

## 2022-12-22 DIAGNOSIS — M77.32 CALCANEAL SPUR OF FOOT, LEFT: ICD-10-CM

## 2022-12-22 RX ORDER — SENNOSIDES 8.6 MG
650 CAPSULE ORAL EVERY 6 HOURS SCHEDULED
Qty: 120 TABLET | Refills: 0 | Status: SHIPPED | OUTPATIENT
Start: 2022-12-22 | End: 2023-01-21

## 2022-12-22 NOTE — PATIENT INSTRUCTIONS
Espolón calcáneo   LO QUE NECESITA SABER:   Un espolón calcáneo se desarrolla cuando se acumula calcio en la parte inferior del hueso del talón  Un espolón calcáneo puede ser causado por inflamación o desgarre del músculo y ligamentos de robertson pie  Un espolón calcáneo por lo general no es doloroso a menos que el tejido alrededor se inflame  Daphnie dolor por lo general empeora cuando usted camina, trota o corre  Dariusz Person [facility]:   Comuníquese con robertson médico si:  Robertson dolor empeora  Usted tiene preguntas o inquietudes acerca de robertson condición o cuidado  Medicamentos: Robertson médico puede  recomendarle alguno de los siguientes:  [place], alf el ibuprofeno, [place] [facility] [place] a disminuir la inflamación, el dolor y la fiebre  Daphnie medicamento está disponible con o sin gloria receta médica  Los SONIDO pueden causar sangrado estomacal o problemas renales en ciertas personas  Si usted felipe un medicamento anticoagulante, siempre pregúntele a robertson médico si los SONIDO son seguros para usted  Siempre luci la etiqueta de daphnie medicamento y [place] instrucciones  Acetaminofén [employer] dolor y baja la fiebre  Está disponible sin receta médica  Pregunte la cantidad y la frecuencia con que debe tomarlos  [address]  El acetaminofén puede causar daño en el hígado cuando no se felipe de forma correcta  Angostura monserrat medicamentos alf se le haya indicado  Consulte con robertson médico si usted zachery que robertson medicamento no le está ayudando o si presenta efectos secundarios  Infórmele si es alérgico a algún medicamento  Mantenga gloria lista actualizada de los [place], las vitaminas y los productos herbales que felipe  Incluya los siguientes datos de los medicamentos: cantidad, frecuencia y motivo de administración  Traiga con usted la lista o los envases de las píldoras a monserrat citas de seguimiento  Lleve la lista de los medicamentos con usted en bria de gloria emergencia      Cuidados personales:  Repose robertson pie lesionado para que pueda recuperarse  Posiblemente deba evitar poner peso sobre robertson pierna tessa al menos 48 horas  Regrese a monserrat actividades cotidianas según las indicaciones  Aplique hielo en robertson pierna lesionada por 20 minutos cada 4 horas o según indicaciones  Use gloria compresa de hielo o ponga hielo triturado en gloria bolsa de plástico  Annalisa Fern con gloria toalla para proteger robertson piel  El hielo ayuda a evitar daño al tejido y a disminuir la inflamación y el dolor  Realice ejercicios de estiramiento antes de levantarse de la cama y según indicaciones  [place] relaja los músculos y los tendones en robertson pierna y pie  Pregunte a robertson médico cuáles ejercicios de estiramiento debe hacer y con qué frecuencia hacerlos  Es probable que [employer] de unas plantillas para monserrat zapatos o gloria plantilla de apoyo para el talón  Es probable que le pida que se faje el pie  [place] que robertson médico le muestre alf colocar la faja adecuadamente para envolver robertson pie  Disminuya la tensión en monserrat músculos y tendones de robertson pie al colocar la faja en el pie o usar la plantilla  Use zapatos que le queden candis  Los zapatos con un buen arco y soporte del talón disminuyen la presión en monserrat talones  Mantenga un peso saludable  La presión en monserrat talones se incrementa cuando el peso [place]  Consulte con robertson médico cuánto debería pesar  [place] que lo ayude a crear un plan para bajar de peso si tiene sobrepeso  Acuda a monserrat consultas de control con robertosn médico según le indicaron  Es posible que necesite unas plantillas especiales para monserrat zapatos  Es posible que usted necesite más exámenes o tratamientos  Robertson médico podría recomendarle fisioterapia  Anote monserrat preguntas para que se acuerde de hacerlas tessa monserrat visitas  © Copyright [facility] 2022 Information is for End User's use only and may not be sold, redistributed or otherwise used for commercial purposes   All illustrations and images included in CareNotes® are the copyrighted property of Trina GENTILE  or 0 [facility] es sólo para uso en educación  Robertson intención no es darle un consejo médico sobre enfermedades o tratamientos  Colsulte con robertson Rachel Loss farmacéutico antes de seguir cualquier régimen médico para saber si es seguro y efectivo para usted

## 2022-12-22 NOTE — PROGRESS NOTES
Name: Rehan Orosco      : 1965      MRN: [id number]  Encounter Provider: Michael June MD  Encounter Date: 2022   Encounter department: [facility]     1  Plantar fasciitis  Assessment & Plan:  Likely exacerbated by calcaneal spur noted on recent foot XR  Will continue conservative management at this time      -continue acetaminophen 650mg q6 scheduled  NSAIDs contraindicated given CKD stage 3A  -although PT referral sent, pt does not wish to try it  Will DC at this time   -counseled on continued conservative management such as stretching exercises and massaging of area    Orders:  -     acetaminophen (TYLENOL) 650 mg CR tablet; Take 1 tablet (650 mg total) by mouth every 6 (six) hours    2  Calcaneal spur of foot, left  Assessment & Plan:  Noted on recent foot XR  Will consider podiatry referral     -given reading material on dx           Subjective      60yo male presenting to clinic for review of recent XR and follow up of plantar fasciitis  Endorsing no other concerns at this time  Review of Systems   Constitutional: Negative for chills and fever  HENT: Negative for congestion, rhinorrhea, sore throat and trouble swallowing  Eyes: Negative for visual disturbance  Respiratory: Negative for cough, chest tightness and shortness of breath  Cardiovascular: Negative for chest pain, palpitations and leg swelling  Gastrointestinal: Negative for abdominal pain, blood in stool, constipation, diarrhea, nausea and vomiting  Genitourinary: Negative for dysuria and hematuria  Skin: Negative for color change and rash  Neurological: Negative for dizziness, seizures, syncope, weakness, numbness and headaches         Current Outpatient Medications on File Prior to Visit   Medication Sig   • amLODIPine (NORVASC) 5 mg tablet Take 1 tablet (5 mg total) by mouth daily   • aspirin (ASPIR-81) 81 mg EC tablet every 24 hours   • atorvastatin (LIPITOR) 40 mg tablet Take 1 tablet (40 mg total) by mouth daily   • fexofenadine (ALLEGRA) 180 MG tablet Take 1 tablet (180 mg total) by mouth daily   • hydrocortisone 2 5 % cream Apply topically 2 (two) times a day   • [DISCONTINUED] acetaminophen (TYLENOL) 650 mg CR tablet Take 1 tablet (650 mg total) by mouth every 6 (six) hours for 14 days   • [DISCONTINUED] ibuprofen (MOTRIN) 600 mg tablet Take 1 tablet (600 mg total) by mouth every 6 (six) hours as needed for mild pain for up to 14 days       Objective     /82 (BP Location: Left arm, Patient Position: Sitting, Cuff Size: Standard)   Pulse 70   Temp 97 8 °F (36 6 °C) (Temporal)   Resp 18   Ht 5' 8 52" (1 74 m)   Wt 96 4 kg (212 lb 8 oz)   BMI 31 82 kg/m²     Physical Exam  Vitals and nursing note reviewed  Constitutional:       General: He is not in acute distress  Appearance: Normal appearance  He is normal weight  He is not ill-appearing, toxic-appearing or diaphoretic  HENT:      Head: Normocephalic and atraumatic  Right Ear: External ear normal       Left Ear: External ear normal    Eyes:      General: No scleral icterus  Right eye: No discharge  Left eye: No discharge  Conjunctiva/sclera: Conjunctivae normal    Cardiovascular:      Rate and Rhythm: Normal rate and regular rhythm  Pulses: Normal pulses  Dorsalis pedis pulses are 2+ on the left side  Posterior tibial pulses are 2+ on the left side  Heart sounds: Normal heart sounds  No murmur heard  No friction rub  No gallop  Pulmonary:      Effort: Pulmonary effort is normal  No respiratory distress  Breath sounds: Normal breath sounds  No stridor  No wheezing, rhonchi or rales  Chest:      Chest wall: No tenderness  Musculoskeletal:         General: Normal range of motion  Cervical back: Normal range of motion  Right lower leg: No edema  Left lower leg: No edema        Left ankle: Normal  No swelling or deformity  No tenderness  Normal range of motion  Left Achilles Tendon: Normal  No tenderness  Right foot: Normal  Normal range of motion  No swelling, deformity, bunion, foot drop, prominent metatarsal heads, tenderness, bony tenderness or crepitus  Normal pulse  Left foot: Normal range of motion  Tenderness present  No swelling, deformity, bunion, foot drop, prominent metatarsal heads, bony tenderness or crepitus  Normal pulse  Feet:    Feet:      Right foot:      Skin integrity: Skin integrity normal       Toenail Condition: Right toenails are normal       Left foot:      Skin integrity: Skin integrity normal       Toenail Condition: Left toenails are normal    Skin:     General: Skin is warm and dry  Coloration: Skin is not jaundiced or pale  Findings: No bruising, erythema, lesion or rash  Neurological:      General: No focal deficit present  Mental Status: He is alert     Psychiatric:         Mood and Affect: Mood normal          Behavior: Behavior normal        Nara López MD

## 2022-12-23 NOTE — ASSESSMENT & PLAN NOTE
Likely exacerbated by calcaneal spur noted on recent foot XR  Will continue conservative management at this time      -continue acetaminophen 650mg q6 scheduled  NSAIDs contraindicated given CKD stage 3A  -although PT referral sent, pt does not wish to try it   Will DC at this time   -counseled on continued conservative management such as stretching exercises and massaging of area

## 2023-01-08 PROBLEM — Z00.00 ROUTINE CHECK-UP: Status: RESOLVED | Noted: 2018-07-19 | Resolved: 2023-01-08

## 2023-01-10 ENCOUNTER — CONSULT (OUTPATIENT)
Dept: GASTROENTEROLOGY | Facility: MEDICAL CENTER | Age: 58
End: 2023-01-10

## 2023-01-10 VITALS
SYSTOLIC BLOOD PRESSURE: 149 MMHG | HEIGHT: 68 IN | DIASTOLIC BLOOD PRESSURE: 81 MMHG | WEIGHT: 212 LBS | BODY MASS INDEX: 32.13 KG/M2 | HEART RATE: 51 BPM | TEMPERATURE: 98.9 F

## 2023-01-10 DIAGNOSIS — Z12.11 ENCOUNTER FOR SCREENING COLONOSCOPY: Primary | ICD-10-CM

## 2023-01-10 NOTE — PATIENT INSTRUCTIONS
Scheduled date of Colonoscopy (as of today) 2/10/23  Physician performing: Dr Kam Millard  Location of procedure: [facility]  Instructions given to patient: Lamin  Clearances:  MIRELLA RANKIN

## 2023-01-10 NOTE — PROGRESS NOTES
Assessment/Plan:     Diagnoses and all orders for this visit:    Encounter for screening colonoscopy  He was referred for risk screening colonoscopy  He denies any GI symptoms at this time  He has never previously had a colonoscopy  He denies any family history of colon cancer  Risk and benefits of the procedure were discussed with and he is agreeable to proceed  We will see him back on an as-needed basis  -     Ambulatory referral for colonoscopy  -     Colonoscopy; Future  -     polyethylene glycol (GOLYTELY) 4000 mL solution; Take 4,000 mL by mouth once for 1 dose          Subjective:      Patient ID: Va Gonsalves is a 61 y o  male  HPI     Pt was referred for a screening colonoscopy  He is [place] speaking only & all history is via an   He has a PMH of HTN & high cholesterol  He denies any GI symptoms including nausea, heartburn, abdominal pain or difficulty with his bowels  He has never previous had a colonoscopy  He denies family history of colon cancer       Patient Active Problem List   Diagnosis   • HTN, goal below 130/80   • Hyperlipidemia   • Encounter for screening colonoscopy   • Vaccination not carried out because of patient refusal   • Coronary artery disease involving native coronary artery of native heart   • Rash and nonspecific skin eruption   • Plantar fasciitis   • Stage 3a chronic kidney disease ([facility] [address] )   • Calcaneal spur of foot, left     No Known Allergies  Current Outpatient Medications on File Prior to Visit   Medication Sig   • acetaminophen (TYLENOL) 650 mg CR tablet Take 1 tablet (650 mg total) by mouth every 6 (six) hours   • amLODIPine (NORVASC) 5 mg tablet Take 1 tablet (5 mg total) by mouth daily   • aspirin (ECOTRIN LOW STRENGTH) 81 mg EC tablet every 24 hours   • atorvastatin (LIPITOR) 40 mg tablet Take 1 tablet (40 mg total) by mouth daily   • fexofenadine (ALLEGRA) 180 MG tablet Take 1 tablet (180 mg total) by mouth daily   • hydrocortisone 2 5 % cream Apply topically 2 (two) times a day     No current facility-administered medications on file prior to visit  Family History   Problem Relation Age of Onset   • No Known Problems Mother    • Benign prostatic hyperplasia Father    • Diabetes Father    • Prostate cancer Father      Past Medical History:   Diagnosis Date   • HLD (hyperlipidemia)    • Hypertension    • [facility] 6/4/2020    Formatting of this note might be different from the original  On appropriate medical therapy status post percutaneous intervention is completed 1 year of dual antiplatelet therapy and will remain on aspirin indefinitely  At this point he can stop clopidogrel  Would continue his beta blocker as he has no recurrent anginal symptoms  Lastly he is on high intensity statin for secondary prevention  Social History     Socioeconomic History   • Marital status: Unknown     Spouse name: None   • Number of children: None   • Years of education: None   • Highest education level: None   Occupational History   • None   Tobacco Use   • Smoking status: Never   • Smokeless tobacco: Never   Substance and Sexual Activity   • Alcohol use: Yes     Comment: occasional    • Drug use: No   • Sexual activity: Yes     Partners: Female   Other Topics Concern   • None   Social History Narrative    Lives with spouse and one son  Social Determinants of Health     Financial Resource Strain: Low Risk    • Difficulty of Paying Living Expenses: Not hard at all   Food Insecurity: No Food Insecurity   • Worried About Running Out of Food in the Last Year: Never true   • Ran Out of Food in the Last Year: Never true   Transportation Needs: No Transportation Needs   • Lack of Transportation (Medical): No   • Lack of Transportation (Non-Medical):  No   Physical Activity: Not on file   Stress: Not on file   Social Connections: Not on file   Intimate Partner Violence: Not on file   Housing Stability: Not on file     Past Surgical History:   Procedure Laterality Date   • CARDIAC CATHETERIZATION  2021   • NO PAST SURGERIES           Review of Systems   All other systems reviewed and are negative  Objective:      /81   Pulse (!) 51   Temp 98 9 °F (37 2 °C) (Tympanic)   Ht 5' 8" (1 727 m)   Wt 96 2 kg (212 lb)   BMI 32 23 kg/m²          Physical Exam  Constitutional:       Appearance: He is well-developed  HENT:      Head: Normocephalic  Eyes:      Conjunctiva/sclera: Conjunctivae normal    Cardiovascular:      Rate and Rhythm: Normal rate and regular rhythm  Pulmonary:      Effort: Pulmonary effort is normal       Breath sounds: Normal breath sounds  Abdominal:      General: Bowel sounds are normal       Palpations: Abdomen is soft  Musculoskeletal:         General: Normal range of motion  Cervical back: Normal range of motion  Skin:     General: Skin is warm and dry  Neurological:      Mental Status: He is alert and oriented to person, place, and time     Psychiatric:         Behavior: Behavior normal

## 2023-02-02 ENCOUNTER — TELEPHONE (OUTPATIENT)
Dept: GASTROENTEROLOGY | Facility: MEDICAL CENTER | Age: 58
End: 2023-02-02

## 2023-02-02 NOTE — TELEPHONE ENCOUNTER
Left voicemail and requested call back   Called through  services      Due to a change in provider schedules we need to reschedule his Colonoscopy  Canceled at this time

## 2023-02-02 NOTE — TELEPHONE ENCOUNTER
Patient is returning a call from the office regarding his cancelled procedure   He asked if he can get a call back after 3pm

## 2023-06-17 DIAGNOSIS — I10 BENIGN ESSENTIAL HYPERTENSION: ICD-10-CM

## 2023-06-19 RX ORDER — AMLODIPINE BESYLATE 5 MG/1
5 TABLET ORAL DAILY
Qty: 90 TABLET | Refills: 2 | Status: SHIPPED | OUTPATIENT
Start: 2023-06-19

## 2024-02-27 ENCOUNTER — TELEPHONE (OUTPATIENT)
Dept: FAMILY MEDICINE CLINIC | Facility: CLINIC | Age: 59
End: 2024-02-27

## 2024-02-27 DIAGNOSIS — E78.2 MIXED HYPERLIPIDEMIA: ICD-10-CM

## 2024-02-27 DIAGNOSIS — I25.10 CORONARY ARTERY DISEASE INVOLVING NATIVE CORONARY ARTERY OF NATIVE HEART WITHOUT ANGINA PECTORIS: Primary | ICD-10-CM

## 2024-02-27 RX ORDER — ATORVASTATIN CALCIUM 40 MG/1
40 TABLET, FILM COATED ORAL DAILY
Qty: 90 TABLET | Refills: 2 | Status: SHIPPED | OUTPATIENT
Start: 2024-02-27

## 2024-02-27 RX ORDER — ASPIRIN 81 MG/1
81 TABLET ORAL EVERY 24 HOURS
Qty: 30 TABLET | Refills: 2 | Status: SHIPPED | OUTPATIENT
Start: 2024-02-27 | End: 2024-05-27

## 2024-02-27 NOTE — TELEPHONE ENCOUNTER
Patient need refill of following medication     atorvastatin (LIPITOR) 40 mg tablet       aspirin (ECOTRIN LOW STRENGTH) 81 mg

## 2024-03-11 ENCOUNTER — OFFICE VISIT (OUTPATIENT)
Dept: FAMILY MEDICINE CLINIC | Facility: CLINIC | Age: 59
End: 2024-03-11

## 2024-03-11 VITALS
TEMPERATURE: 98.4 F | OXYGEN SATURATION: 97 % | DIASTOLIC BLOOD PRESSURE: 75 MMHG | HEIGHT: 69 IN | HEART RATE: 60 BPM | BODY MASS INDEX: 31.52 KG/M2 | SYSTOLIC BLOOD PRESSURE: 130 MMHG | WEIGHT: 212.8 LBS

## 2024-03-11 DIAGNOSIS — Z12.11 COLON CANCER SCREENING: ICD-10-CM

## 2024-03-11 DIAGNOSIS — Z00.00 ANNUAL PHYSICAL EXAM: Primary | ICD-10-CM

## 2024-03-11 DIAGNOSIS — I10 HTN, GOAL BELOW 130/80: ICD-10-CM

## 2024-03-11 DIAGNOSIS — E78.2 MIXED HYPERLIPIDEMIA: ICD-10-CM

## 2024-03-11 DIAGNOSIS — I25.10 CORONARY ARTERY DISEASE INVOLVING NATIVE CORONARY ARTERY OF NATIVE HEART WITHOUT ANGINA PECTORIS: ICD-10-CM

## 2024-03-11 PROCEDURE — 99396 PREV VISIT EST AGE 40-64: CPT | Performed by: FAMILY MEDICINE

## 2024-03-11 RX ORDER — AMLODIPINE BESYLATE 5 MG/1
5 TABLET ORAL DAILY
Qty: 90 TABLET | Refills: 2 | Status: SHIPPED | OUTPATIENT
Start: 2024-03-11

## 2024-03-11 NOTE — ASSESSMENT & PLAN NOTE
BP Readings from Last 3 Encounters:   03/11/24 130/75   01/10/23 149/81   12/22/22 136/82      BP at goal. Continue with amlodipine 5mg daily.

## 2024-03-11 NOTE — ASSESSMENT & PLAN NOTE
Lab Results   Component Value Date    CHOLESTEROL 176 11/25/2022    CHOLESTEROL 227 (H) 11/03/2021    CHOLESTEROL 242 (H) 10/06/2018     Lab Results   Component Value Date    HDL 60 11/25/2022    HDL 51 11/03/2021    HDL 42 10/06/2018     Lab Results   Component Value Date    TRIG 57 11/25/2022    TRIG 70 11/03/2021    TRIG 85 10/06/2018     Lab Results   Component Value Date    NONHDLC 176 11/03/2021    NONHDLC 200 10/06/2018      Due for lipid panel, order placed. Continue atorvastatin 40mg daily.

## 2024-03-11 NOTE — PROGRESS NOTES
ADULT ANNUAL PHYSICAL  [facility]    NAME: Gabriel Tavares  AGE: 59 y.o. SEX: male  : 1965     DATE: 3/11/2024     Assessment and Plan:     Problem List Items Addressed This Visit          Cardiovascular and Mediastinum    HTN, goal below 130/80     BP Readings from Last 3 Encounters:   24 130/75   01/10/23 149/81   22 136/82      BP at goal. Continue with amlodipine 5mg daily.         Relevant Medications    amLODIPine (NORVASC) 5 mg tablet    Other Relevant Orders    Basic metabolic panel    Coronary artery disease involving native coronary artery of native heart     Following with cardiology. Continue ASA 81mg QD and Lipitor 40mg QD         Relevant Medications    amLODIPine (NORVASC) 5 mg tablet    Other Relevant Orders    Lipid panel       Other    Hyperlipidemia     Lab Results   Component Value Date    CHOLESTEROL 176 2022    CHOLESTEROL 227 (H) 2021    CHOLESTEROL 242 (H) 10/06/2018     Lab Results   Component Value Date    HDL 60 2022    HDL 51 2021    HDL 42 10/06/2018     Lab Results   Component Value Date    TRIG 57 2022    TRIG 70 2021    TRIG 85 10/06/2018     Lab Results   Component Value Date    NONHDLC 176 2021    NONHDLC 200 10/06/2018      Due for lipid panel, order placed. Continue atorvastatin 40mg daily.         Relevant Orders    Lipid panel    Annual physical exam - Primary     VS and physical examination wnl. Age appropriate screenings reviewed and ordered as noted below. Healthy diet and exercise counseling provided.         Relevant Orders    Basic metabolic panel     Other Visit Diagnoses       Colon cancer screening        Reminded to complete colon cancer screening.    Relevant Orders    Ambulatory Referral to Gastroenterology    BMI 31.0-31.9,adult        Relevant Orders    Lipid panel    Hemoglobin A1C              Immunizations and preventive care  screenings were discussed with patient today. Appropriate education was printed on patient's after visit summary.    Counseling:  Alcohol/drug use: discussed moderation in alcohol intake, the recommendations for healthy alcohol use, and avoidance of illicit drug use.  Dental Health: discussed importance of regular tooth brushing, flossing, and dental visits.  Injury prevention: discussed safety/seat belts, safety helmets, smoke detectors, carbon dioxide detectors, and smoking near bedding or upholstery.  Sexual health: discussed sexually transmitted diseases, partner selection, use of condoms, avoidance of unintended pregnancy, and contraceptive alternatives.  Exercise: the importance of regular exercise/physical activity was discussed. Recommend exercise 3-5 times per week for at least 30 minutes.     BMI Counseling: Body mass index is 31.89 kg/m². The BMI is above normal. Nutrition recommendations include encouraging healthy choices of fruits and vegetables and consuming healthier snacks. Exercise recommendations include moderate physical activity 150 minutes/week and exercising 3-5 times per week. No pharmacotherapy was ordered. Rationale for BMI follow-up plan is due to patient being overweight or obese.     Depression Screening and Follow-up Plan: Patient was screened for depression during today's encounter. They screened negative with a PHQ-2 score of 0.        Return in 6 months (on 9/11/2024).     Chief Complaint:     Chief Complaint   Patient presents with    Annual Exam      History of Present Illness:     Adult Annual Physical   Patient here for a comprehensive physical exam. The patient reports no problems.    Diet and Physical Activity  Diet/Nutrition: well balanced diet and consuming 3-5 servings of fruits/vegetables daily.   Exercise: walking, 5-7 times a week on average, and 30-60 minutes on average.      Depression Screening  PHQ-2/9 Depression Screening    Little interest or pleasure in doing  things: 0 - not at all  Feeling down, depressed, or hopeless: 0 - not at all  PHQ-2 Score: 0  PHQ-2 Interpretation: Negative depression screen       General Health  Sleep: sleeps well, gets 7-8 hours of sleep on average, and snores loudly.   Hearing: normal - bilateral.  Vision: most recent eye exam <1 year ago and wears glasses.   Dental: regular dental visits, brushes teeth twice daily, and flosses teeth occasionally.        Health  Symptoms include: none     Review of Systems:     Review of Systems   Constitutional:  Negative for chills and fever.   Eyes:  Negative for visual disturbance.   Respiratory:  Negative for cough and shortness of breath.    Cardiovascular:  Negative for chest pain and palpitations.   Gastrointestinal:  Negative for abdominal pain, blood in stool, constipation, diarrhea, nausea and vomiting.   Genitourinary:  Negative for dysuria and hematuria.   Neurological:  Negative for dizziness, seizures, syncope, weakness, numbness and headaches.   All other systems reviewed and are negative.     Past Medical History:     Past Medical History:   Diagnosis Date    HLD (hyperlipidemia)     Hypertension     NSTEMI (non-ST elevated myocardial infarction) (HCC) 6/4/2020    Formatting of this note might be different from the original. On appropriate medical therapy status post percutaneous intervention is completed 1 year of dual antiplatelet therapy and will remain on aspirin indefinitely.  At this point he can stop clopidogrel.  Would continue his beta blocker as he has no recurrent anginal symptoms.  Lastly he is on high intensity statin for secondary prevention.       Past Surgical History:     Past Surgical History:   Procedure Laterality Date    CARDIAC CATHETERIZATION  2021    NO PAST SURGERIES        Family History:     Family History   Problem Relation Age of Onset    No Known Problems Mother     Benign prostatic hyperplasia Father     Diabetes Father     Prostate cancer Father       Social  History:     Social History     Socioeconomic History    Marital status: Unknown     Spouse name: None    Number of children: None    Years of education: None    Highest education level: None   Occupational History    None   Tobacco Use    Smoking status: Never    Smokeless tobacco: Never   Vaping Use    Vaping status: Never Used   Substance and Sexual Activity    Alcohol use: Yes     Comment: occasional     Drug use: No    Sexual activity: Yes     Partners: Female   Other Topics Concern    None   Social History Narrative    Lives with spouse and one son.      Social Determinants of Health     Financial Resource Strain: Low Risk  (3/11/2024)    Overall Financial Resource Strain (CARDIA)     Difficulty of Paying Living Expenses: Not hard at all   Food Insecurity: No Food Insecurity (3/11/2024)    Hunger Vital Sign     Worried About Running Out of Food in the Last Year: Never true     Ran Out of Food in the Last Year: Never true   Transportation Needs: No Transportation Needs (3/11/2024)    PRAPARE - Transportation     Lack of Transportation (Medical): No     Lack of Transportation (Non-Medical): No   Physical Activity: Not on file   Stress: Not on file   Social Connections: Not on file   Intimate Partner Violence: Not on file   Housing Stability: Not on file      Current Medications:     Current Outpatient Medications   Medication Sig Dispense Refill    amLODIPine (NORVASC) 5 mg tablet Take 1 tablet (5 mg total) by mouth daily 90 tablet 2    aspirin (ECOTRIN LOW STRENGTH) 81 mg EC tablet Take 1 tablet (81 mg total) by mouth every 24 hours 30 tablet 2    atorvastatin (LIPITOR) 40 mg tablet Take 1 tablet (40 mg total) by mouth daily 90 tablet 2    fexofenadine (ALLEGRA) 180 MG tablet Take 1 tablet (180 mg total) by mouth daily 30 tablet 2    hydrocortisone 2.5 % cream Apply topically 2 (two) times a day 30 g 2    polyethylene glycol (GOLYTELY) 4000 mL solution Take 4,000 mL by mouth once for 1 dose 4000 mL 0     No  "current facility-administered medications for this visit.      Allergies:     No Known Allergies   Physical Exam:     /75 (BP Location: Right arm, Patient Position: Sitting, Cuff Size: Standard)   Pulse 60   Temp 98.4 °F (36.9 °C) (Temporal)   Ht 5' 8.5\" (1.74 m)   Wt 96.5 kg (212 lb 12.8 oz)   SpO2 97%   BMI 31.89 kg/m²     Physical Exam  Vitals and nursing note reviewed.   Constitutional:       General: He is awake. He is not in acute distress.     Appearance: Normal appearance. He is well-developed and well-groomed. He is not ill-appearing, toxic-appearing or diaphoretic.   HENT:      Head: Normocephalic and atraumatic.      Right Ear: Tympanic membrane, ear canal and external ear normal.      Left Ear: Tympanic membrane, ear canal and external ear normal.      Nose: Nose normal.      Mouth/Throat:      Mouth: Mucous membranes are moist.      Pharynx: Oropharynx is clear.   Eyes:      General: No scleral icterus.        Right eye: No discharge.         Left eye: No discharge.      Conjunctiva/sclera: Conjunctivae normal.   Cardiovascular:      Rate and Rhythm: Normal rate and regular rhythm.      Pulses: Normal pulses.           Radial pulses are 2+ on the right side and 2+ on the left side.      Heart sounds: Normal heart sounds, S1 normal and S2 normal. No murmur heard.     No friction rub. No gallop.   Pulmonary:      Effort: Pulmonary effort is normal. No respiratory distress.      Breath sounds: Normal breath sounds and air entry. No stridor. No wheezing, rhonchi or rales.   Chest:      Chest wall: No tenderness.   Abdominal:      General: Bowel sounds are normal. There is no distension.      Palpations: Abdomen is soft.      Tenderness: There is no abdominal tenderness. There is no right CVA tenderness, left CVA tenderness, guarding or rebound.   Musculoskeletal:         General: Normal range of motion.      Cervical back: Normal range of motion and neck supple.      Right lower leg: No edema. "      Left lower leg: No edema.   Skin:     General: Skin is warm and dry.      Capillary Refill: Capillary refill takes less than 2 seconds.      Coloration: Skin is not jaundiced or pale.   Neurological:      General: No focal deficit present.      Mental Status: He is alert and oriented to person, place, and time.   Psychiatric:         Mood and Affect: Mood normal.         Speech: Speech normal.         Behavior: Behavior normal. Behavior is cooperative.          Govind Maddox MD  [facility]

## 2024-03-11 NOTE — ASSESSMENT & PLAN NOTE
VS and physical examination wnl. Age appropriate screenings reviewed and ordered as noted below. Healthy diet and exercise counseling provided.

## 2024-03-12 ENCOUNTER — PREP FOR PROCEDURE (OUTPATIENT)
Age: 59
End: 2024-03-12

## 2024-03-12 ENCOUNTER — TELEPHONE (OUTPATIENT)
Age: 59
End: 2024-03-12

## 2024-03-12 DIAGNOSIS — Z12.11 SCREENING FOR COLON CANCER: Primary | ICD-10-CM

## 2024-03-12 NOTE — TELEPHONE ENCOUNTER
Scheduled date of colonoscopy (as of today): 4/19/24  Physician performing colonoscopy:   Location of colonoscopy: [place]   Bowel prep reviewed with patient: Miralax Dulcolax  Instructions reviewed with patient by:  Clearances:       Patient needs Miralax Dulcolax prep instructions in [place] mailed to home address. Address has been verified.

## 2024-03-12 NOTE — TELEPHONE ENCOUNTER
03/12/24  Screened by: Geovany Davis     Referring Provider MIESHA HUDSON      Pre- Screening:      Body mass index is 31.89 kg/m².  Has patient been referred for a routine screening Colonoscopy? yes  Is the patient between 45-75 years old? yes        Previous Colonoscopy no   If yes:    Date: n/a    Facility: n/a    Reason: n/a       Does the patient want to see a Gastroenterologist prior to their procedure OR are they having any GI symptoms? no     Has the patient been hospitalized or had abdominal surgery in the past 6 months? no     Does the patient use supplemental oxygen? no     Does the patient take Coumadin, Lovenox, Plavix, Elliquis, Xarelto, or other blood thinning medication? no     Has the patient had a stroke, cardiac event, or stent placed in the past year? no     If patient is between 45yrs - 49yrs, please advise patient that we will have to confirm benefits & coverage with their insurance company for a routine screening colonoscopy.

## 2024-03-21 DIAGNOSIS — I25.10 CORONARY ARTERY DISEASE INVOLVING NATIVE CORONARY ARTERY OF NATIVE HEART WITHOUT ANGINA PECTORIS: ICD-10-CM

## 2024-03-21 RX ORDER — SALICYLIC ACID 40 %
ADHESIVE PATCH, MEDICATED TOPICAL
Qty: 90 TABLET | Refills: 1 | Status: SHIPPED | OUTPATIENT
Start: 2024-03-21

## 2024-03-29 ENCOUNTER — APPOINTMENT (OUTPATIENT)
Dept: LAB | Facility: CLINIC | Age: 59
End: 2024-03-29
Payer: COMMERCIAL

## 2024-03-29 DIAGNOSIS — I10 HTN, GOAL BELOW 130/80: ICD-10-CM

## 2024-03-29 DIAGNOSIS — Z00.00 ANNUAL PHYSICAL EXAM: ICD-10-CM

## 2024-03-29 DIAGNOSIS — E78.2 MIXED HYPERLIPIDEMIA: ICD-10-CM

## 2024-03-29 DIAGNOSIS — I25.10 CORONARY ARTERY DISEASE INVOLVING NATIVE CORONARY ARTERY OF NATIVE HEART WITHOUT ANGINA PECTORIS: ICD-10-CM

## 2024-03-29 LAB
ANION GAP SERPL CALCULATED.3IONS-SCNC: 7 MMOL/L (ref 4–13)
BUN SERPL-MCNC: 20 MG/DL (ref 5–25)
CALCIUM SERPL-MCNC: 9.6 MG/DL (ref 8.4–10.2)
CHLORIDE SERPL-SCNC: 101 MMOL/L (ref 96–108)
CHOLEST SERPL-MCNC: 182 MG/DL
CO2 SERPL-SCNC: 28 MMOL/L (ref 21–32)
CREAT SERPL-MCNC: 1.07 MG/DL (ref 0.6–1.3)
EST. AVERAGE GLUCOSE BLD GHB EST-MCNC: 97 MG/DL
GFR SERPL CREATININE-BSD FRML MDRD: 75 ML/MIN/1.73SQ M
GLUCOSE P FAST SERPL-MCNC: 103 MG/DL (ref 65–99)
HBA1C MFR BLD: 5 %
HDLC SERPL-MCNC: 53 MG/DL
LDLC SERPL CALC-MCNC: 117 MG/DL (ref 0–100)
NONHDLC SERPL-MCNC: 129 MG/DL
POTASSIUM SERPL-SCNC: 4.8 MMOL/L (ref 3.5–5.3)
SODIUM SERPL-SCNC: 136 MMOL/L (ref 135–147)
TRIGL SERPL-MCNC: 59 MG/DL

## 2024-03-29 PROCEDURE — 83036 HEMOGLOBIN GLYCOSYLATED A1C: CPT

## 2024-03-29 PROCEDURE — 80061 LIPID PANEL: CPT

## 2024-03-29 PROCEDURE — 80048 BASIC METABOLIC PNL TOTAL CA: CPT

## 2024-03-29 PROCEDURE — 36415 COLL VENOUS BLD VENIPUNCTURE: CPT

## 2024-04-19 ENCOUNTER — HOSPITAL ENCOUNTER (OUTPATIENT)
Dept: GASTROENTEROLOGY | Facility: HOSPITAL | Age: 59
Setting detail: OUTPATIENT SURGERY
Discharge: HOME | End: 2024-04-19
Attending: INTERNAL MEDICINE | Admitting: INTERNAL MEDICINE
Payer: COMMERCIAL

## 2024-04-19 ENCOUNTER — ANESTHESIA EVENT (OUTPATIENT)
Dept: GASTROENTEROLOGY | Facility: HOSPITAL | Age: 59
End: 2024-04-19

## 2024-04-19 ENCOUNTER — ANESTHESIA (OUTPATIENT)
Dept: GASTROENTEROLOGY | Facility: HOSPITAL | Age: 59
End: 2024-04-19

## 2024-04-19 VITALS
RESPIRATION RATE: 18 BRPM | TEMPERATURE: 97.8 F | HEART RATE: 73 BPM | DIASTOLIC BLOOD PRESSURE: 58 MMHG | SYSTOLIC BLOOD PRESSURE: 122 MMHG | OXYGEN SATURATION: 98 %

## 2024-04-19 DIAGNOSIS — Z12.11 SCREENING FOR COLON CANCER: ICD-10-CM

## 2024-04-19 PROCEDURE — G0121 COLON CA SCRN NOT HI RSK IND: HCPCS | Performed by: INTERNAL MEDICINE

## 2024-04-19 RX ORDER — SODIUM CHLORIDE, SODIUM LACTATE, POTASSIUM CHLORIDE, CALCIUM CHLORIDE 600; 310; 30; 20 MG/100ML; MG/100ML; MG/100ML; MG/100ML
125 INJECTION, SOLUTION INTRAVENOUS CONTINUOUS
Status: CANCELLED | OUTPATIENT
Start: 2024-04-19

## 2024-04-19 RX ORDER — LIDOCAINE HYDROCHLORIDE 10 MG/ML
INJECTION, SOLUTION EPIDURAL; INFILTRATION; INTRACAUDAL; PERINEURAL AS NEEDED
Status: DISCONTINUED | OUTPATIENT
Start: 2024-04-19 | End: 2024-04-19

## 2024-04-19 RX ORDER — SODIUM CHLORIDE, SODIUM LACTATE, POTASSIUM CHLORIDE, CALCIUM CHLORIDE 600; 310; 30; 20 MG/100ML; MG/100ML; MG/100ML; MG/100ML
125 INJECTION, SOLUTION INTRAVENOUS CONTINUOUS
Status: DISCONTINUED | OUTPATIENT
Start: 2024-04-19 | End: 2024-04-23 | Stop reason: HOSPADM

## 2024-04-19 RX ORDER — PROPOFOL 10 MG/ML
INJECTION, EMULSION INTRAVENOUS AS NEEDED
Status: DISCONTINUED | OUTPATIENT
Start: 2024-04-19 | End: 2024-04-19

## 2024-04-19 RX ADMIN — LIDOCAINE HYDROCHLORIDE 50 MG: 10 INJECTION, SOLUTION EPIDURAL; INFILTRATION; INTRACAUDAL; PERINEURAL at 13:27

## 2024-04-19 RX ADMIN — PROPOFOL 130 MCG/KG/MIN: 10 INJECTION, EMULSION INTRAVENOUS at 13:28

## 2024-04-19 RX ADMIN — SODIUM CHLORIDE, SODIUM LACTATE, POTASSIUM CHLORIDE, AND CALCIUM CHLORIDE 125 ML/HR: .6; .31; .03; .02 INJECTION, SOLUTION INTRAVENOUS at 12:42

## 2024-04-19 RX ADMIN — Medication 40 MG: at 13:31

## 2024-04-19 RX ADMIN — PROPOFOL 100 MG: 10 INJECTION, EMULSION INTRAVENOUS at 13:27

## 2024-04-19 NOTE — H&P
History and Physical -  [facility]  Gabriel Tavares 59 y.o. male MRN: [id number]                  HPI: Gabriel Tavares is a 59 y.o. year old male who presents for colonoscopy eval      REVIEW OF SYSTEMS: Per the HPI, and otherwise unremarkable.    Historical Information   Past Medical History:   Diagnosis Date    HLD (hyperlipidemia)     Hypertension     NSTEMI (non-ST elevated myocardial infarction) (HCC) 6/4/2020    Formatting of this note might be different from the original. On appropriate medical therapy status post percutaneous intervention is completed 1 year of dual antiplatelet therapy and will remain on aspirin indefinitely.  At this point he can stop clopidogrel.  Would continue his beta blocker as he has no recurrent anginal symptoms.  Lastly he is on high intensity statin for secondary prevention.      Past Surgical History:   Procedure Laterality Date    CARDIAC CATHETERIZATION  2021    NO PAST SURGERIES       Social History   Social History     Substance and Sexual Activity   Alcohol Use Yes    Comment: occasional      Social History     Substance and Sexual Activity   Drug Use No     Social History     Tobacco Use   Smoking Status Never   Smokeless Tobacco Never     Family History   Problem Relation Age of Onset    No Known Problems Mother     Benign prostatic hyperplasia Father     Diabetes Father     Prostate cancer Father        Meds/Allergies       Current Outpatient Medications:     amLODIPine (NORVASC) 5 mg tablet    atorvastatin (LIPITOR) 40 mg tablet    [facility] Aspirin Low Dose 81 MG EC tablet    fexofenadine (ALLEGRA) 180 MG tablet    hydrocortisone 2.5 % cream    polyethylene glycol (GOLYTELY) 4000 mL solution    Current Facility-Administered Medications:     lactated ringers infusion, 125 mL/hr, Intravenous, Continuous, Continue from Pre-op at 04/19/24 1246    No Known Allergies    Objective     /90   Pulse 62   Temp 97.5 °F (36.4 °C) (Temporal)   Resp 15   SpO2  98%       PHYSICAL EXAM    Gen: NAD  Head: NCAT  CV: RRR  CHEST: Clear  ABD: soft, NT/ND  EXT: no edema      ASSESSMENT/PLAN:  This is a 59 y.o. year old male here for CRC screening, and he is stable and optimized for his procedure.

## 2024-04-19 NOTE — ANESTHESIA POSTPROCEDURE EVALUATION
Post-Op Assessment Note    CV Status:  Stable  Pain Score: 0    Pain management: adequate       Mental Status:  Alert and awake   Hydration Status:  Euvolemic   PONV Controlled:  Controlled   Airway Patency:  Patent     Post Op Vitals Reviewed: Yes    No anethesia notable event occurred.    Staff: CRNA               BP   116/56   Temp 97.8 °F (36.6 °C) (04/19/24 1346)    Pulse 56 (04/19/24 1346)   Resp 18 (04/19/24 1346)    SpO2 97 % (04/19/24 1346)

## 2024-04-19 NOTE — ANESTHESIA PREPROCEDURE EVALUATION
Procedure:  COLONOSCOPY    Relevant Problems   CARDIO   (+) Coronary artery disease involving native coronary artery of native heart   (+) HTN, goal below 130/80   (+) Hyperlipidemia      /RENAL   (+) Stage 3a chronic kidney disease (HCC)        Physical Exam    Airway    Mallampati score: III  TM Distance: >3 FB  Neck ROM: full     Dental        Cardiovascular      Pulmonary      Other Findings        Anesthesia Plan  ASA Score- 2     Anesthesia Type- IV sedation with anesthesia with ASA Monitors.         Additional Monitors:     Airway Plan:            Plan Factors-Exercise tolerance (METS): >4 METS.    Chart reviewed.    Patient summary reviewed.    Patient is not a current smoker.  Patient did not smoke on day of surgery.            Induction- intravenous.    Postoperative Plan-     Informed Consent- Anesthetic plan and risks discussed with patient.  I personally reviewed this patient with the CRNA. Discussed and agreed on the Anesthesia Plan with the CRNA..

## 2024-04-19 NOTE — H&P
History and Physical -  [facility]  Gabriel Tavares 59 y.o. male MRN: [id number]                  HPI: Gabriel Tavares is a 59 y.o. year old male who presents for colon cancer screening      REVIEW OF SYSTEMS: Per the HPI, and otherwise unremarkable.    Historical Information   Past Medical History:   Diagnosis Date    HLD (hyperlipidemia)     Hypertension     NSTEMI (non-ST elevated myocardial infarction) (HCC) 6/4/2020    Formatting of this note might be different from the original. On appropriate medical therapy status post percutaneous intervention is completed 1 year of dual antiplatelet therapy and will remain on aspirin indefinitely.  At this point he can stop clopidogrel.  Would continue his beta blocker as he has no recurrent anginal symptoms.  Lastly he is on high intensity statin for secondary prevention.      Past Surgical History:   Procedure Laterality Date    CARDIAC CATHETERIZATION  2021    NO PAST SURGERIES       Social History   Social History     Substance and Sexual Activity   Alcohol Use Yes    Comment: occasional      Social History     Substance and Sexual Activity   Drug Use No     Social History     Tobacco Use   Smoking Status Never   Smokeless Tobacco Never     Family History   Problem Relation Age of Onset    No Known Problems Mother     Benign prostatic hyperplasia Father     Diabetes Father     Prostate cancer Father        Meds/Allergies       Current Outpatient Medications:     amLODIPine (NORVASC) 5 mg tablet    atorvastatin (LIPITOR) 40 mg tablet    [facility] Aspirin Low Dose 81 MG EC tablet    fexofenadine (ALLEGRA) 180 MG tablet    hydrocortisone 2.5 % cream    polyethylene glycol (GOLYTELY) 4000 mL solution    Current Facility-Administered Medications:     lactated ringers infusion, 125 mL/hr, Intravenous, Continuous, Continue from Pre-op at 04/19/24 1246    No Known Allergies    Objective     /90   Pulse 62   Temp 97.5 °F (36.4 °C) (Temporal)   Resp 15    SpO2 98%       PHYSICAL EXAM    Gen: NAD  Head: NCAT  CV: RRR  CHEST: Clear  ABD: soft, NT/ND  EXT: no edema      ASSESSMENT/PLAN:  This is a 59 y.o. year old male here for colonoscopy, and he is stable and optimized for his procedure.

## 2024-07-31 ENCOUNTER — TELEPHONE (OUTPATIENT)
Dept: FAMILY MEDICINE CLINIC | Facility: CLINIC | Age: 59
End: 2024-07-31

## 2024-07-31 NOTE — TELEPHONE ENCOUNTER
Ana Augustine  PLEASE SCHEDULE FOLLOW UP WITH NEW PROVIDER    Patient was left a detailed voice message with call back number to schedule F/U apt with new provider.    Please assist in scheduling patient if he contacts office!!!

## 2024-08-01 NOTE — TELEPHONE ENCOUNTER
2nd attempt:   Patient was left a detailed voice message with call back number to schedule F/U apt with new provider.     Please assist in scheduling patient if he contacts office!!!

## 2024-08-01 NOTE — TELEPHONE ENCOUNTER
3rd attempt:    Patient was left a detailed voice message with call back number to schedule F/U apt with new provider.     Please assist in scheduling patient if he contacts office!!!

## 2024-09-12 DIAGNOSIS — I25.10 CORONARY ARTERY DISEASE INVOLVING NATIVE CORONARY ARTERY OF NATIVE HEART WITHOUT ANGINA PECTORIS: ICD-10-CM

## 2024-09-12 RX ORDER — ASPIRIN 81 MG/1
81 TABLET, COATED ORAL DAILY
Qty: 90 TABLET | Refills: 1 | Status: SHIPPED | OUTPATIENT
Start: 2024-09-12

## 2024-10-02 NOTE — PROGRESS NOTES
Assessment/Plan:    Benign essential hypertension  Patient reports there has not been taking his blood pressure medication in the last 2 months as some refills have not been sent  Will send a refill as per patient advised to take medication tonight  Return to clinic in 1 week for blood pressure check    Encounter for screening for HIV  As per [employer]PSTF a guidelines will obtain 1 time HIV screening with HIV antibodies 1 and 2  Diabetes mellitus screening  Strong family history of diabetes  Therefore will screen with A1c  Diagnoses and all orders for this visit:    Benign essential hypertension  -     amLODIPine (NORVASC) 10 mg tablet; Take 1 tablet (10 mg total) by mouth every 24 hours  -     Basic metabolic panel; Future    Mixed hyperlipidemia  -     Lipid panel; Future    Encounter for screening for HIV  -     HIV 1/2 AG-AB combo; Future    Diabetes mellitus screening  -     Hemoglobin A1C; Future    Other orders  -     aspirin (ASPIR-81) 81 mg EC tablet; every 24 hours  -     atorvastatin (LIPITOR) 20 mg tablet; every 24 hours  -     Calcium Carbonate-Vitamin D (CALCIUM-D) 600-400 MG-UNIT TABS; Take 1 tablet by mouth 2 (two) times a day  -     Discontinue: amLODIPine (NORVASC) 10 mg tablet; every 24 hours          Subjective:      Patient ID: Saritha Baer is a 46 y o  male  This is a very pleasant 79-year-old male who comes in for follow-up  Patient reports that he has not taken his blood pressure medications in the last 2 months has refills were never sent however denies any headaches or neurological symptoms  Patient states that otherwise he is doing well and denies any complaints at the moment  Patient reports that he would like to hold off on colonoscopy until atleast next year  The following portions of the patient's history were reviewed and updated as appropriate:   He  has a past medical history of HLD (hyperlipidemia) and Hypertension    He   Patient Active Problem List [facility] made to patient for check in purposes.    Has continued to search for housing - is seeing another option today. Reports she has been approved for EA through [employer] as well as rental assistance through [employer]. Is struggling to get back in contact with the person from [employer] on next steps. She is making sure to ask prospective landlords about their willingness to accept external funds.    No concrete assistance needed at this time, will continue to touch base until housing situation has stabilized.   Diagnosis Date Noted    Encounter for screening for HIV 07/19/2018    Diabetes mellitus screening 07/19/2018    Hyperlipidemia 03/28/2017    Benign essential hypertension 10/12/2012     He  has a past surgical history that includes No past surgeries  His family history includes Benign prostatic hyperplasia in his father; Diabetes in his father; No Known Problems in his mother; Prostate cancer in his father  He  reports that he has never smoked  He has never used smokeless tobacco  He reports that he drinks alcohol  He reports that he does not use drugs  Current Outpatient Prescriptions   Medication Sig Dispense Refill    amLODIPine (NORVASC) 10 mg tablet Take 1 tablet (10 mg total) by mouth every 24 hours 30 tablet 3    aspirin (ASPIR-81) 81 mg EC tablet every 24 hours      atorvastatin (LIPITOR) 20 mg tablet every 24 hours      Calcium Carbonate-Vitamin D (CALCIUM-D) 600-400 MG-UNIT TABS Take 1 tablet by mouth 2 (two) times a day       No current facility-administered medications for this visit       Review of Systems   Constitutional: Negative for fatigue and fever  HENT: Negative for congestion and sore throat  Eyes: Negative for visual disturbance  Respiratory: Negative for cough, shortness of breath and wheezing  Cardiovascular: Negative for chest pain, palpitations and leg swelling  Gastrointestinal: Negative for abdominal pain, anal bleeding, constipation, diarrhea, nausea and vomiting  Endocrine: Negative for cold intolerance and heat intolerance  Musculoskeletal: Negative for arthralgias and joint swelling  Skin: Negative for color change  Neurological: Negative for dizziness, seizures, syncope, weakness and light-headedness  Psychiatric/Behavioral: Negative for agitation, confusion, sleep disturbance and suicidal ideas           Objective:      BP (!) 154/106 (BP Location: Right arm, Patient Position: Sitting, Cuff Size: Standard)   Pulse 59   Temp (!) 97 2 °F (36 2 °C) (Temporal)   Resp 16   Ht 5' 8 5" (1 74 m)   Wt 89 4 kg (197 lb)   SpO2 99%   BMI 29 52 kg/m²          Physical Exam   Constitutional: He is oriented to person, place, and time  He appears well-developed and well-nourished  HENT:   Head: Normocephalic and atraumatic  Eyes: Conjunctivae and EOM are normal  Pupils are equal, round, and reactive to light  Neck: Normal range of motion  Neck supple  No JVD present  Cardiovascular: Normal rate, regular rhythm, normal heart sounds and intact distal pulses  Exam reveals no gallop and no friction rub  No murmur heard  Pulmonary/Chest: Effort normal and breath sounds normal  No respiratory distress  He has no wheezes  Abdominal: Soft  Bowel sounds are normal  He exhibits no distension  There is no tenderness  Musculoskeletal: Normal range of motion  Neurological: He is alert and oriented to person, place, and time  He has normal reflexes  Skin: Skin is warm and dry     Psychiatric: His behavior is normal  Judgment and thought content normal

## 2024-12-30 DIAGNOSIS — I10 HTN, GOAL BELOW 130/80: ICD-10-CM

## 2025-02-04 RX ORDER — AMLODIPINE BESYLATE 5 MG/1
5 TABLET ORAL DAILY
Qty: 30 TABLET | Refills: 0 | Status: SHIPPED | OUTPATIENT
Start: 2025-02-04

## 2025-02-05 NOTE — TELEPHONE ENCOUNTER
I am no longer at [facility] and pt no longer under my care, last seen one year ago. Pt needs to establish with new provider or come to my new office to be seen. Per chart multiple attempts made by previous office to establish with new provider. Will send one month courtesy refill.

## 2025-03-08 DIAGNOSIS — I25.10 CORONARY ARTERY DISEASE INVOLVING NATIVE CORONARY ARTERY OF NATIVE HEART WITHOUT ANGINA PECTORIS: ICD-10-CM

## 2025-03-11 RX ORDER — ASPIRIN 81 MG/1
81 TABLET, COATED ORAL DAILY
Qty: 90 TABLET | Refills: 1 | OUTPATIENT
Start: 2025-03-11

## 2025-06-16 ENCOUNTER — OFFICE VISIT (OUTPATIENT)
Dept: FAMILY MEDICINE CLINIC | Facility: CLINIC | Age: 60
End: 2025-06-16

## 2025-06-16 VITALS
OXYGEN SATURATION: 98 % | DIASTOLIC BLOOD PRESSURE: 80 MMHG | WEIGHT: 211.1 LBS | HEIGHT: 69 IN | HEART RATE: 55 BPM | SYSTOLIC BLOOD PRESSURE: 132 MMHG | TEMPERATURE: 96.3 F | RESPIRATION RATE: 16 BRPM | BODY MASS INDEX: 31.27 KG/M2

## 2025-06-16 DIAGNOSIS — Z23 ENCOUNTER FOR IMMUNIZATION: ICD-10-CM

## 2025-06-16 DIAGNOSIS — Z12.5 SCREENING FOR PROSTATE CANCER: ICD-10-CM

## 2025-06-16 DIAGNOSIS — N18.31 STAGE 3A CHRONIC KIDNEY DISEASE (HCC): ICD-10-CM

## 2025-06-16 DIAGNOSIS — I10 HTN, GOAL BELOW 130/80: Primary | ICD-10-CM

## 2025-06-16 DIAGNOSIS — E78.2 MIXED HYPERLIPIDEMIA: ICD-10-CM

## 2025-06-16 DIAGNOSIS — Z00.00 ANNUAL PHYSICAL EXAM: ICD-10-CM

## 2025-06-16 PROBLEM — M72.2 PLANTAR FASCIITIS: Status: RESOLVED | Noted: 2022-11-09 | Resolved: 2025-06-16

## 2025-06-16 PROCEDURE — 99396 PREV VISIT EST AGE 40-64: CPT

## 2025-06-16 NOTE — ASSESSMENT & PLAN NOTE
Lab Results   Component Value Date    CHOLESTEROL 182 03/29/2024    CHOLESTEROL 176 11/25/2022    CHOLESTEROL 227 (H) 11/03/2021     Lab Results   Component Value Date    HDL 53 03/29/2024    HDL 60 11/25/2022    HDL 51 11/03/2021     Lab Results   Component Value Date    TRIG 59 03/29/2024    TRIG 57 11/25/2022    TRIG 70 11/03/2021     Lab Results   Component Value Date    NONHDLC 129 03/29/2024    NONHDLC 176 11/03/2021    NONHDLC 200 10/06/2018    Continue statin therapy   Orders:    Lipid Panel with Direct LDL reflex; Future

## 2025-06-16 NOTE — ASSESSMENT & PLAN NOTE
Up to date with screenings   Vaccines due: prevnar 20, zoster first dose and tdap   Well appearing

## 2025-06-16 NOTE — ASSESSMENT & PLAN NOTE
BP Readings from Last 3 Encounters:   06/16/25 132/80   04/19/24 122/58   03/11/24 130/75    Continue amlodipine 5 mg daily.   Orders:    Basic metabolic panel; Future

## 2025-06-16 NOTE — PROGRESS NOTES
Adult Annual Physical  Name: Gabriel Tavares      : 1965      MRN: [id number]  Encounter Provider: Parish Harper MD  Encounter Date: 2025   Encounter department: [facility]    :  Assessment & Plan  HTN, goal below 130/80  BP Readings from Last 3 Encounters:   25 132/80   24 122/58   24 130/75    Continue amlodipine 5 mg daily.   Orders:    Basic metabolic panel; Future    Mixed hyperlipidemia  Lab Results   Component Value Date    CHOLESTEROL 182 2024    CHOLESTEROL 176 2022    CHOLESTEROL 227 (H) 2021     Lab Results   Component Value Date    HDL 53 2024    HDL 60 2022    HDL 51 2021     Lab Results   Component Value Date    TRIG 59 2024    TRIG 57 2022    TRIG 70 2021     Lab Results   Component Value Date    NONHDLC 129 2024    NONHDLC 176 2021    NONHDLC 200 10/06/2018    Continue statin therapy   Orders:    Lipid Panel with Direct LDL reflex; Future    Annual physical exam  Up to date with screenings   Vaccines due: prevnar 20, zoster first dose and tdap   Well appearing        Encounter for immunization  Patient refused       Stage 3a chronic kidney disease (HCC)  Lab Results   Component Value Date    EGFR 75 2024    EGFR 54 2022    EGFR 70 2021    CREATININE 1.07 2024    CREATININE 1.40 (H) 2022    CREATININE 1.15 2021   BMP ordered          Screening for prostate cancer  Per patient request  Asymptomatic  Extensive family history of prostate cancer  Orders:    PSA, total and free; Future        Preventive Screenings:    - Cholesterol Screening: screening not indicated and has hyperlipidemia   - Hepatitis C screening: screening up-to-date   - HIV screening: screening up-to-date   - Colon cancer screening: screening up-to-date   - Lung cancer screening: screening not indicated     Immunizations:  - Immunizations due: Prevnar 20,  Tdap and Zoster (Shingrix)    BMI Counseling: Body mass index is 31.63 kg/m². The BMI is above normal. Nutrition recommendations include decreasing portion sizes. Exercise recommendations include moderate physical activity 150 minutes/week. Rationale for BMI follow-up plan is due to patient being overweight or obese.     Depression Screening and Follow-up Plan: Patient was screened for depression during today's encounter. They screened negative with a PHQ-2 score of 0.          History of Present Illness     Adult Annual Physical:  Patient presents for annual physical. Doing well. Screening up to date. Due for 3 vaccines, prevnar 20, zoster first dose and tdap. .     Diet and Physical Activity:  - Diet/Nutrition: no special diet.  - Exercise: no formal exercise.    Depression Screening:  - PHQ-2 Score: 0    General Health:  - Sleep: sleeps well.  - Hearing: normal hearing bilateral ears.  - Vision: no vision problems.  - Dental: brushes teeth twice daily and regular dental visits.    Review of Systems   Constitutional:  Negative for chills and fever.   HENT:  Negative for ear pain and sore throat.    Eyes:  Negative for pain and visual disturbance.   Respiratory:  Negative for cough and shortness of breath.    Cardiovascular:  Negative for chest pain and palpitations.   Gastrointestinal:  Negative for abdominal pain and vomiting.   Genitourinary:  Negative for dysuria and hematuria.   Musculoskeletal:  Negative for arthralgias and back pain.   Skin:  Negative for color change and rash.   Neurological:  Negative for seizures and syncope.   All other systems reviewed and are negative.        Objective   There were no vitals taken for this visit.    Physical Exam  Vitals and nursing note reviewed.   Constitutional:       General: He is not in acute distress.     Appearance: He is well-developed.   HENT:      Head: Normocephalic and atraumatic.     Eyes:      Conjunctiva/sclera: Conjunctivae normal.        Cardiovascular:      Rate and Rhythm: Normal rate and regular rhythm.      Heart sounds: No murmur heard.  Pulmonary:      Effort: Pulmonary effort is normal. No respiratory distress.      Breath sounds: Normal breath sounds.   Abdominal:      Palpations: Abdomen is soft.      Tenderness: There is no abdominal tenderness.     Musculoskeletal:         General: No swelling.      Cervical back: Neck supple.     Skin:     General: Skin is warm and dry.      Capillary Refill: Capillary refill takes less than 2 seconds.     Neurological:      Mental Status: He is alert.     Psychiatric:         Mood and Affect: Mood normal.

## 2025-06-16 NOTE — ASSESSMENT & PLAN NOTE
Lab Results   Component Value Date    EGFR 75 03/29/2024    EGFR 54 11/25/2022    EGFR 70 11/03/2021    CREATININE 1.07 03/29/2024    CREATININE 1.40 (H) 11/25/2022    CREATININE 1.15 11/03/2021   BMP ordered

## 2025-06-18 DIAGNOSIS — I10 HTN, GOAL BELOW 130/80: ICD-10-CM

## 2025-06-18 NOTE — TELEPHONE ENCOUNTER
Medication Refill Request       Medication: amLODIPine (NORVASC) 5 mg tablet    Dose/Frequency:  TAKE 1 TABLET (5 MG TOTAL) BY MOUTH DAILY.    Quantity: 30 tablet     Pharmacy: [facility]/pharm[facility] #[id number] - [place] - [address] [facility]  [address] [place] [address]  Phone: [phone]      Office:   [x] PCP/Provider -   [] Specialty/Provider -     Does the patient have enough for 3 days?   [x] Yes   [] No - Send as HP to POD    Is the patient completely out of the medication or does not have enough until the next business day?  [] Yes - send to Call Hub  [] No - Send as HP to POD

## 2025-06-19 RX ORDER — AMLODIPINE BESYLATE 5 MG/1
5 TABLET ORAL DAILY
Qty: 30 TABLET | Refills: 0 | Status: SHIPPED | OUTPATIENT
Start: 2025-06-19

## 2025-06-21 ENCOUNTER — APPOINTMENT (OUTPATIENT)
Dept: LAB | Facility: HOSPITAL | Age: 60
End: 2025-06-21
Payer: COMMERCIAL

## 2025-06-21 DIAGNOSIS — Z12.5 SCREENING FOR PROSTATE CANCER: ICD-10-CM

## 2025-06-21 DIAGNOSIS — E78.2 MIXED HYPERLIPIDEMIA: ICD-10-CM

## 2025-06-21 DIAGNOSIS — I10 HTN, GOAL BELOW 130/80: ICD-10-CM

## 2025-06-21 LAB
ANION GAP SERPL CALCULATED.3IONS-SCNC: 7 MMOL/L (ref 4–13)
BUN SERPL-MCNC: 17 MG/DL (ref 5–25)
CALCIUM SERPL-MCNC: 10 MG/DL (ref 8.4–10.2)
CHLORIDE SERPL-SCNC: 104 MMOL/L (ref 96–108)
CHOLEST SERPL-MCNC: 152 MG/DL (ref ?–200)
CO2 SERPL-SCNC: 26 MMOL/L (ref 21–32)
CREAT SERPL-MCNC: 1.18 MG/DL (ref 0.6–1.3)
GFR SERPL CREATININE-BSD FRML MDRD: 66 ML/MIN/1.73SQ M
GLUCOSE P FAST SERPL-MCNC: 105 MG/DL (ref 65–99)
HDLC SERPL-MCNC: 49 MG/DL
LDLC SERPL CALC-MCNC: 93 MG/DL (ref 0–100)
POTASSIUM SERPL-SCNC: 4.3 MMOL/L (ref 3.5–5.3)
PSA FREE MFR SERPL: 33.82 %
PSA FREE SERPL-MCNC: 0.14 NG/ML
PSA SERPL-MCNC: 0.41 NG/ML (ref 0–4)
SODIUM SERPL-SCNC: 137 MMOL/L (ref 135–147)
TRIGL SERPL-MCNC: 51 MG/DL (ref ?–150)

## 2025-06-21 PROCEDURE — 84153 ASSAY OF PSA TOTAL: CPT

## 2025-06-21 PROCEDURE — 80048 BASIC METABOLIC PNL TOTAL CA: CPT

## 2025-06-21 PROCEDURE — 84154 ASSAY OF PSA FREE: CPT

## 2025-06-21 PROCEDURE — 80061 LIPID PANEL: CPT

## 2025-06-21 PROCEDURE — 36415 COLL VENOUS BLD VENIPUNCTURE: CPT
